# Patient Record
Sex: FEMALE | Race: BLACK OR AFRICAN AMERICAN | NOT HISPANIC OR LATINO | Employment: FULL TIME | URBAN - METROPOLITAN AREA
[De-identification: names, ages, dates, MRNs, and addresses within clinical notes are randomized per-mention and may not be internally consistent; named-entity substitution may affect disease eponyms.]

---

## 2022-04-16 ENCOUNTER — OFFICE VISIT (OUTPATIENT)
Dept: URGENT CARE | Facility: CLINIC | Age: 47
End: 2022-04-16

## 2022-04-16 VITALS
RESPIRATION RATE: 16 BRPM | DIASTOLIC BLOOD PRESSURE: 76 MMHG | TEMPERATURE: 98.4 F | OXYGEN SATURATION: 99 % | BODY MASS INDEX: 33.32 KG/M2 | HEIGHT: 65 IN | HEART RATE: 68 BPM | SYSTOLIC BLOOD PRESSURE: 116 MMHG | WEIGHT: 200 LBS

## 2022-04-16 DIAGNOSIS — K13.0 CHEILITIS: ICD-10-CM

## 2022-04-16 DIAGNOSIS — Z76.89 ENCOUNTER TO ESTABLISH CARE: ICD-10-CM

## 2022-04-16 PROCEDURE — 99202 OFFICE O/P NEW SF 15 MIN: CPT | Performed by: NURSE PRACTITIONER

## 2022-04-16 RX ORDER — MOMETASONE FUROATE 1 MG/G
CREAM TOPICAL
Qty: 15 G | Refills: 0 | Status: SHIPPED | OUTPATIENT
Start: 2022-04-16 | End: 2023-06-16

## 2022-04-16 RX ORDER — CLOTRIMAZOLE 1 %
CREAM (GRAM) TOPICAL
Qty: 12 G | Refills: 0 | Status: SHIPPED | OUTPATIENT
Start: 2022-04-16 | End: 2023-06-16

## 2022-04-16 RX ORDER — LEVOTHYROXINE SODIUM 0.07 MG/1
75 TABLET ORAL
COMMUNITY
End: 2024-03-11

## 2022-04-16 RX ORDER — ALPRAZOLAM 1 MG/1
2 TABLET ORAL NIGHTLY PRN
COMMUNITY
End: 2023-11-10 | Stop reason: SDUPTHER

## 2022-04-16 NOTE — PROGRESS NOTES
"Subjective     Ely Jen Rodas is a 46 y.o. female who presents with Oral Swelling (Dry lips and not sure why. )            Pt reports new onset of dryness to her lips and irritation that started 2 weeks ago. Burning to edges of her lips. She has been treating it with vaseline, carmex and blistex. Denies any improvement in her symptoms. She is a daily smoker. Has not tried any new lotions, soaps or cosmetics to her face.       Review of Systems   HENT:        Redness and irritation to lips   All other systems reviewed and are negative.         Past Medical History:   Diagnosis Date   • Arthritis    • BRCA2 negative       Past Surgical History:   Procedure Laterality Date   • ABDOMINAL HYSTERECTOMY TOTAL     • TUBAL COAGULATION LAPAROSCOPIC BILATERAL        Social History     Socioeconomic History   • Marital status: Single     Spouse name: Not on file   • Number of children: Not on file   • Years of education: Not on file   • Highest education level: Not on file   Occupational History   • Not on file   Tobacco Use   • Smoking status: Never Smoker   • Smokeless tobacco: Never Used   Vaping Use   • Vaping Use: Never used   Substance and Sexual Activity   • Alcohol use: Not Currently   • Drug use: Yes     Types: Marijuana   • Sexual activity: Not on file   Other Topics Concern   • Not on file   Social History Narrative   • Not on file     Social Determinants of Health     Financial Resource Strain: Not on file   Food Insecurity: Not on file   Transportation Needs: Not on file   Physical Activity: Not on file   Stress: Not on file   Social Connections: Not on file   Intimate Partner Violence: Not on file   Housing Stability: Not on file         Objective     /76 (BP Location: Right arm, Patient Position: Sitting, BP Cuff Size: Adult)   Pulse 68   Temp 36.9 °C (98.4 °F) (Temporal)   Resp 16   Ht 1.651 m (5' 5\")   Wt 90.7 kg (200 lb)   SpO2 99%   BMI 33.28 kg/m²      Physical Exam  Vitals and nursing note " reviewed.   Constitutional:       Appearance: Normal appearance. She is normal weight.   HENT:      Head: Normocephalic and atraumatic.      Nose: Nose normal.      Mouth/Throat:      Mouth: Mucous membranes are moist. No oral lesions or angioedema.      Pharynx: Oropharynx is clear.     Eyes:      Extraocular Movements: Extraocular movements intact.      Pupils: Pupils are equal, round, and reactive to light.   Cardiovascular:      Rate and Rhythm: Normal rate and regular rhythm.   Pulmonary:      Effort: Pulmonary effort is normal.   Musculoskeletal:         General: Normal range of motion.      Cervical back: Normal range of motion.   Skin:     General: Skin is warm and dry.      Capillary Refill: Capillary refill takes less than 2 seconds.   Neurological:      General: No focal deficit present.      Mental Status: She is alert and oriented to person, place, and time. Mental status is at baseline.   Psychiatric:         Mood and Affect: Mood normal.         Speech: Speech normal.         Thought Content: Thought content normal.         Judgment: Judgment normal.                             Assessment & Plan        1. Cheilitis  - clotrimazole (LOTRIMIN) 1 % Cream; Apply thin layer to affected areas BID for one week  Dispense: 12 g; Refill: 0  - mometasone (ELOCON) 0.1 % Cream; Apply thin layer to affected areas BID for one week  Dispense: 15 g; Refill: 0    2. Encounter to establish care  - Referral to establish with [facility] PCP       May continue to use vaseline in between applications of steroid cream  Supportive care, differential diagnoses, and indications for immediate follow-up discussed with patient.    Pathogenesis of diagnosis discussed including typical length and natural progression.      Instructed to return to  or nearest emergency department if symptoms fail to improve, for any change in condition, further concerns, or new concerning symptoms.  Patient states understanding of the plan of care and  discharge instructions.

## 2022-04-18 ENCOUNTER — TELEPHONE (OUTPATIENT)
Dept: SCHEDULING | Facility: IMAGING CENTER | Age: 47
End: 2022-04-18

## 2022-07-08 ENCOUNTER — HOSPITAL ENCOUNTER (OUTPATIENT)
Facility: MEDICAL CENTER | Age: 47
End: 2022-07-08
Attending: PREVENTIVE MEDICINE
Payer: COMMERCIAL

## 2022-07-08 ENCOUNTER — EMPLOYEE HEALTH (OUTPATIENT)
Dept: OCCUPATIONAL MEDICINE | Facility: CLINIC | Age: 47
End: 2022-07-08

## 2022-07-08 ENCOUNTER — EH NON-PROVIDER (OUTPATIENT)
Dept: OCCUPATIONAL MEDICINE | Facility: CLINIC | Age: 47
End: 2022-07-08

## 2022-07-08 DIAGNOSIS — Z02.89 VISIT FOR OCCUPATIONAL HEALTH EXAMINATION: ICD-10-CM

## 2022-07-08 DIAGNOSIS — Z02.89 VISIT FOR OCCUPATIONAL HEALTH EXAMINATION: Primary | ICD-10-CM

## 2022-07-08 LAB
AMP AMPHETAMINE: NORMAL
BAR BARBITURATES: NORMAL
BZO BENZODIAZEPINES: NORMAL
COC COCAINE: NORMAL
INT CON NEG: NORMAL
INT CON POS: NORMAL
MDMA ECSTASY: NORMAL
MET METHAMPHETAMINES: NORMAL
MTD METHADONE: NORMAL
OPI OPIATES: NORMAL
OXY OXYCODONE: NORMAL
PCP PHENCYCLIDINE: NORMAL
POC URINE DRUG SCREEN OCDRS: NORMAL
THC: NORMAL

## 2022-07-08 PROCEDURE — 86480 TB TEST CELL IMMUN MEASURE: CPT | Performed by: PREVENTIVE MEDICINE

## 2022-07-08 PROCEDURE — 80305 DRUG TEST PRSMV DIR OPT OBS: CPT | Performed by: PREVENTIVE MEDICINE

## 2022-07-08 PROCEDURE — 90715 TDAP VACCINE 7 YRS/> IM: CPT | Performed by: NURSE PRACTITIONER

## 2022-07-08 PROCEDURE — 8915 PR COMPREHENSIVE PHYSICAL: Performed by: PREVENTIVE MEDICINE

## 2022-07-11 ENCOUNTER — RESEARCH ENCOUNTER (OUTPATIENT)
Dept: RESEARCH | Facility: WORKSITE | Age: 47
End: 2022-07-11
Payer: MEDICAID

## 2022-07-11 DIAGNOSIS — Z00.6 RESEARCH STUDY PATIENT: Primary | ICD-10-CM

## 2022-07-11 LAB
GAMMA INTERFERON BACKGROUND BLD IA-ACNC: 0.09 IU/ML
M TB IFN-G BLD-IMP: NEGATIVE
M TB IFN-G CD4+ BCKGRND COR BLD-ACNC: 0.01 IU/ML
MITOGEN IGNF BCKGRD COR BLD-ACNC: >10 IU/ML
QFT TB2 - NIL TBQ2: 0 IU/ML

## 2022-08-22 LAB
APOB+LDLR+PCSK9 GENE MUT ANL BLD/T: NOT DETECTED
BRCA1+BRCA2 DEL+DUP + FULL MUT ANL BLD/T: NOT DETECTED
MLH1+MSH2+MSH6+PMS2 GN DEL+DUP+FUL M: NOT DETECTED

## 2022-08-26 ENCOUNTER — EH NON-PROVIDER (OUTPATIENT)
Dept: OCCUPATIONAL MEDICINE | Facility: CLINIC | Age: 47
End: 2022-08-26

## 2022-10-07 ENCOUNTER — HOSPITAL ENCOUNTER (OUTPATIENT)
Facility: MEDICAL CENTER | Age: 47
End: 2022-10-07
Attending: SPECIALIST
Payer: COMMERCIAL

## 2022-10-07 PROCEDURE — RXMED WILLOW AMBULATORY MEDICATION CHARGE: Performed by: OBSTETRICS & GYNECOLOGY

## 2022-10-07 PROCEDURE — 87624 HPV HI-RISK TYP POOLED RSLT: CPT

## 2022-10-07 PROCEDURE — 88175 CYTOPATH C/V AUTO FLUID REDO: CPT

## 2022-10-10 ENCOUNTER — PHARMACY VISIT (OUTPATIENT)
Dept: PHARMACY | Facility: MEDICAL CENTER | Age: 47
End: 2022-10-10
Payer: COMMERCIAL

## 2022-10-11 LAB
CYTOLOGY REG CYTOL: NORMAL
HPV HR 12 DNA CVX QL NAA+PROBE: NEGATIVE
HPV16 DNA SPEC QL NAA+PROBE: NEGATIVE
HPV18 DNA SPEC QL NAA+PROBE: NEGATIVE
SPECIMEN SOURCE: NORMAL

## 2022-10-12 PROCEDURE — RXMED WILLOW AMBULATORY MEDICATION CHARGE: Performed by: SPECIALIST

## 2022-10-13 ENCOUNTER — PHARMACY VISIT (OUTPATIENT)
Dept: PHARMACY | Facility: MEDICAL CENTER | Age: 47
End: 2022-10-13
Payer: COMMERCIAL

## 2022-10-25 ENCOUNTER — HOSPITAL ENCOUNTER (OUTPATIENT)
Dept: RADIOLOGY | Facility: MEDICAL CENTER | Age: 47
End: 2022-10-25
Payer: COMMERCIAL

## 2022-11-08 PROCEDURE — RXMED WILLOW AMBULATORY MEDICATION CHARGE: Performed by: OBSTETRICS & GYNECOLOGY

## 2022-11-11 ENCOUNTER — APPOINTMENT (OUTPATIENT)
Dept: RADIOLOGY | Facility: MEDICAL CENTER | Age: 47
End: 2022-11-11
Payer: COMMERCIAL

## 2022-11-11 DIAGNOSIS — Z12.31 VISIT FOR SCREENING MAMMOGRAM: ICD-10-CM

## 2022-11-12 ENCOUNTER — PHARMACY VISIT (OUTPATIENT)
Dept: PHARMACY | Facility: MEDICAL CENTER | Age: 47
End: 2022-11-12
Payer: COMMERCIAL

## 2022-12-29 PROCEDURE — RXMED WILLOW AMBULATORY MEDICATION CHARGE: Performed by: OBSTETRICS & GYNECOLOGY

## 2023-01-03 ENCOUNTER — PHARMACY VISIT (OUTPATIENT)
Dept: PHARMACY | Facility: MEDICAL CENTER | Age: 48
End: 2023-01-03
Payer: COMMERCIAL

## 2023-03-19 PROCEDURE — RXMED WILLOW AMBULATORY MEDICATION CHARGE: Performed by: OBSTETRICS & GYNECOLOGY

## 2023-03-20 ENCOUNTER — PHARMACY VISIT (OUTPATIENT)
Dept: PHARMACY | Facility: MEDICAL CENTER | Age: 48
End: 2023-03-20
Payer: COMMERCIAL

## 2023-05-11 PROCEDURE — RXMED WILLOW AMBULATORY MEDICATION CHARGE: Performed by: OBSTETRICS & GYNECOLOGY

## 2023-05-16 ENCOUNTER — PHARMACY VISIT (OUTPATIENT)
Dept: PHARMACY | Facility: MEDICAL CENTER | Age: 48
End: 2023-05-16
Payer: COMMERCIAL

## 2023-06-14 ENCOUNTER — HOSPITAL ENCOUNTER (OUTPATIENT)
Facility: MEDICAL CENTER | Age: 48
End: 2023-06-14
Attending: OBSTETRICS & GYNECOLOGY
Payer: COMMERCIAL

## 2023-06-14 ENCOUNTER — GYNECOLOGY VISIT (OUTPATIENT)
Dept: OBGYN | Facility: CLINIC | Age: 48
End: 2023-06-14
Payer: COMMERCIAL

## 2023-06-14 VITALS — BODY MASS INDEX: 35.61 KG/M2 | SYSTOLIC BLOOD PRESSURE: 143 MMHG | DIASTOLIC BLOOD PRESSURE: 88 MMHG | WEIGHT: 214 LBS

## 2023-06-14 DIAGNOSIS — Z12.31 BREAST CANCER SCREENING BY MAMMOGRAM: ICD-10-CM

## 2023-06-14 DIAGNOSIS — Z11.3 SCREENING EXAMINATION FOR STD (SEXUALLY TRANSMITTED DISEASE): ICD-10-CM

## 2023-06-14 DIAGNOSIS — Z01.419 WELL WOMAN EXAM WITH ROUTINE GYNECOLOGICAL EXAM: ICD-10-CM

## 2023-06-14 PROCEDURE — 87510 GARDNER VAG DNA DIR PROBE: CPT

## 2023-06-14 PROCEDURE — 87480 CANDIDA DNA DIR PROBE: CPT

## 2023-06-14 PROCEDURE — 3077F SYST BP >= 140 MM HG: CPT | Performed by: OBSTETRICS & GYNECOLOGY

## 2023-06-14 PROCEDURE — 3079F DIAST BP 80-89 MM HG: CPT | Performed by: OBSTETRICS & GYNECOLOGY

## 2023-06-14 PROCEDURE — 87660 TRICHOMONAS VAGIN DIR PROBE: CPT

## 2023-06-14 PROCEDURE — 99386 PREV VISIT NEW AGE 40-64: CPT | Performed by: OBSTETRICS & GYNECOLOGY

## 2023-06-14 NOTE — PROGRESS NOTES
Gynecology Annual    [facility] ALEE MICHAEL    47 y.o.    Chief complaint    Chief Complaint   Patient presents with    Gynecologic Exam     New patient annual        HPI    Patient is a 46 yo  with history of hysterectomy in 2018 in [place] for abnormal uterine bleeding presents today for gyn annual exam. She states she had a pap smear last year at another gynecologist's office and was positive for trichomonas. She states she wasn't sexually active with anyone for several years. She was given antibiotics to treat this.   She has had left sided hip pain for the past 2 months, which is also radiating to her left pelvis. Describes feeling as a 'knot' and cramping.   She reports she did have COVID 19 in February.   Last year was prescribed estradiol patch to treat hot flushes, mood swings and insomnia.   Initially patches were effective, but feels it has worn off.   Reports having chronic loose bowels. She is in search of a local PCP as most of her healthcare she seeks in [place] due to not being able to find healthcare in [place].   In terms of her prior hysterectomy, she is not sure if ovaries were removed or not. She is also unsure if she has a cervix.   Not sexually active. No abnormal or foul odor.       Review of Systems:  Review of Systems   All other systems reviewed and are negative.       Past Obstetrical History:     x 3    Past Gynecological History:    Hx hysterectomy  Last pap:  NILM, HPV negative  H/o abnormal pap: Yes  H/o STIs: hx asymptomatic trichomonas.   DEXA: N/A  Last Mammogram: Unsure  LMP: No LMP recorded. Patient has had a hysterectomy.  BCM: Hysterectomy    Past Medical History    Past Medical History:   Diagnosis Date    Arthritis     BRCA2 negative     Hypothyroid        Past Surgical History    Past Surgical History:   Procedure Laterality Date    ABDOMINAL HYSTERECTOMY TOTAL      LEEP      TUBAL COAGULATION LAPAROSCOPIC BILATERAL         History reviewed. No pertinent family  history.    Allergies    Allergies   Allergen Reactions    Amoxicillin     Latex     Penicillins        Medications    Current Outpatient Medications   Medication Sig Dispense Refill    tinidazole (TINDAMAX) 500 MG tablet Take 1 oral tablet 2 times a day 14 Tablet 0    Estradiol (LILA) 0.025 MG/24HR PATCH BIWEEKLY Apply one patch twice weekly for menopausal symptoms 8 Patch 5    levothyroxine (SYNTHROID) 75 MCG Tab Take 75 mcg by mouth every morning on an empty stomach.      ALPRAZolam (XANAX) 1 MG Tab Take 1 mg by mouth at bedtime as needed for Sleep.      clotrimazole (LOTRIMIN) 1 % Cream Apply thin layer to affected areas BID for one week 12 g 0    mometasone (ELOCON) 0.1 % Cream Apply thin layer to affected areas BID for one week 15 g 0     No current facility-administered medications for this visit.       Social    Social History     Tobacco Use    Smoking status: Never    Smokeless tobacco: Never   Vaping Use    Vaping Use: Never used   Substance Use Topics    Alcohol use: Not Currently    Drug use: Yes     Types: Marijuana        OBJECTIVE:    Vitals    BP (!) 143/88 (BP Location: Right arm, Patient Position: Sitting, BP Cuff Size: Large adult)   Wt 214 lb   BMI 35.61 kg/m²     Physical Exam    GENERAL: Well developed, well nourished, female in no acute distress.    HEENT: NCAT, mucus membranes moist    Neck: Supple, nontender, no DAVID, no thyromegaly    Breasts: Symmetric, Nontender, no masses, no nipple discharge, no skin changes    CV: RRR    Pulm: CTAB    Abdomen: Soft ND NT. Prior laparoscopy scars well healed.     Ext: CARDENAS    : Normal Vulva, vagina. No lesions present. No abnormal discharge. No blood.    Urethral meatus normal.    Vaginal apex clear, no abnormal lesions, discharge or blood. Cervix and uterus surgically absent. Vaginitis swab collected.     Adnexa no masses or tenderness.     Labs/Pathology:    10/7/2022   Pap NILM, HPV negative  Trichomonas +    A/P    There is no problem list on  file for this patient.      DAVID MICHAEL    47 y.o.    No obstetric history on file.    1. Well woman exam with routine gynecological exam - pap no longer indicated due to history of hysterectomy. Continue yearly pelvic and breast exam. Monthly self breast exam encouraged.    2. Breast cancer screening by mammogram - screening mammogram ordered. Advised to obtain this yearly.    3. Screening examination for STD (sexually transmitted disease) - history of trichomonas, vaginitis swab collected. Urine GC/CT ordered.      RTC in 1 year or PRN.       Time spent: 30 minutes      Pedro Nuñez M.D.    Obstetrics and Gynecology    6/14/20232:01 PM

## 2023-06-15 LAB
CANDIDA DNA VAG QL PROBE+SIG AMP: NEGATIVE
G VAGINALIS DNA VAG QL PROBE+SIG AMP: NEGATIVE
T VAGINALIS DNA VAG QL PROBE+SIG AMP: NEGATIVE

## 2023-06-16 ENCOUNTER — OFFICE VISIT (OUTPATIENT)
Dept: MEDICAL GROUP | Facility: MEDICAL CENTER | Age: 48
End: 2023-06-16
Payer: COMMERCIAL

## 2023-06-16 ENCOUNTER — HOSPITAL ENCOUNTER (OUTPATIENT)
Dept: LAB | Facility: MEDICAL CENTER | Age: 48
End: 2023-06-16
Attending: STUDENT IN AN ORGANIZED HEALTH CARE EDUCATION/TRAINING PROGRAM
Payer: COMMERCIAL

## 2023-06-16 ENCOUNTER — APPOINTMENT (OUTPATIENT)
Dept: RADIOLOGY | Facility: MEDICAL CENTER | Age: 48
End: 2023-06-16
Attending: STUDENT IN AN ORGANIZED HEALTH CARE EDUCATION/TRAINING PROGRAM
Payer: COMMERCIAL

## 2023-06-16 VITALS
BODY MASS INDEX: 37.85 KG/M2 | TEMPERATURE: 97.1 F | OXYGEN SATURATION: 100 % | DIASTOLIC BLOOD PRESSURE: 64 MMHG | HEART RATE: 69 BPM | SYSTOLIC BLOOD PRESSURE: 118 MMHG | HEIGHT: 63 IN | WEIGHT: 213.63 LBS

## 2023-06-16 DIAGNOSIS — E03.9 ACQUIRED HYPOTHYROIDISM: ICD-10-CM

## 2023-06-16 DIAGNOSIS — D64.9 ANEMIA, UNSPECIFIED TYPE: ICD-10-CM

## 2023-06-16 DIAGNOSIS — M25.552 LEFT HIP PAIN: ICD-10-CM

## 2023-06-16 DIAGNOSIS — Z13.1 SCREENING FOR DIABETES MELLITUS: ICD-10-CM

## 2023-06-16 DIAGNOSIS — G89.29 CHRONIC PAIN OF BOTH KNEES: ICD-10-CM

## 2023-06-16 DIAGNOSIS — M25.561 CHRONIC PAIN OF BOTH KNEES: ICD-10-CM

## 2023-06-16 DIAGNOSIS — K21.9 GASTROESOPHAGEAL REFLUX DISEASE WITHOUT ESOPHAGITIS: ICD-10-CM

## 2023-06-16 DIAGNOSIS — F41.9 ANXIETY: ICD-10-CM

## 2023-06-16 DIAGNOSIS — Z11.3 SCREENING EXAMINATION FOR STD (SEXUALLY TRANSMITTED DISEASE): ICD-10-CM

## 2023-06-16 DIAGNOSIS — Z12.11 COLON CANCER SCREENING: ICD-10-CM

## 2023-06-16 DIAGNOSIS — Z13.6 SCREENING FOR CARDIOVASCULAR CONDITION: ICD-10-CM

## 2023-06-16 DIAGNOSIS — Z11.59 NEED FOR HEPATITIS C SCREENING TEST: ICD-10-CM

## 2023-06-16 DIAGNOSIS — G25.81 RESTLESS LEG: ICD-10-CM

## 2023-06-16 DIAGNOSIS — Z00.00 ENCOUNTER FOR MEDICAL EXAMINATION TO ESTABLISH CARE: ICD-10-CM

## 2023-06-16 DIAGNOSIS — M25.562 CHRONIC PAIN OF BOTH KNEES: ICD-10-CM

## 2023-06-16 LAB
ALBUMIN SERPL BCP-MCNC: 4.1 G/DL (ref 3.2–4.9)
ALBUMIN/GLOB SERPL: 1.2 G/DL
ALP SERPL-CCNC: 122 U/L (ref 30–99)
ALT SERPL-CCNC: 34 U/L (ref 2–50)
ANION GAP SERPL CALC-SCNC: 10 MMOL/L (ref 7–16)
AST SERPL-CCNC: 17 U/L (ref 12–45)
BASOPHILS # BLD AUTO: 0.5 % (ref 0–1.8)
BASOPHILS # BLD: 0.03 K/UL (ref 0–0.12)
BILIRUB SERPL-MCNC: 0.3 MG/DL (ref 0.1–1.5)
BUN SERPL-MCNC: 14 MG/DL (ref 8–22)
CALCIUM ALBUM COR SERPL-MCNC: 9.2 MG/DL (ref 8.5–10.5)
CALCIUM SERPL-MCNC: 9.3 MG/DL (ref 8.4–10.2)
CHLORIDE SERPL-SCNC: 107 MMOL/L (ref 96–112)
CHOLEST SERPL-MCNC: 193 MG/DL (ref 100–199)
CO2 SERPL-SCNC: 23 MMOL/L (ref 20–33)
CREAT SERPL-MCNC: 0.7 MG/DL (ref 0.5–1.4)
EOSINOPHIL # BLD AUTO: 0.34 K/UL (ref 0–0.51)
EOSINOPHIL NFR BLD: 5.8 % (ref 0–6.9)
ERYTHROCYTE [DISTWIDTH] IN BLOOD BY AUTOMATED COUNT: 40.2 FL (ref 35.9–50)
EST. AVERAGE GLUCOSE BLD GHB EST-MCNC: 123 MG/DL
FASTING STATUS PATIENT QL REPORTED: NORMAL
GFR SERPLBLD CREATININE-BSD FMLA CKD-EPI: 107 ML/MIN/1.73 M 2
GLOBULIN SER CALC-MCNC: 3.3 G/DL (ref 1.9–3.5)
GLUCOSE SERPL-MCNC: 92 MG/DL (ref 65–99)
HBA1C MFR BLD: 5.9 % (ref 4–5.6)
HCT VFR BLD AUTO: 42.8 % (ref 37–47)
HCV AB SER QL: NORMAL
HDLC SERPL-MCNC: 64 MG/DL
HGB BLD-MCNC: 13.8 G/DL (ref 12–16)
IMM GRANULOCYTES # BLD AUTO: 0.01 K/UL (ref 0–0.11)
IMM GRANULOCYTES NFR BLD AUTO: 0.2 % (ref 0–0.9)
LDLC SERPL CALC-MCNC: 112 MG/DL
LYMPHOCYTES # BLD AUTO: 2.73 K/UL (ref 1–4.8)
LYMPHOCYTES NFR BLD: 46.3 % (ref 22–41)
MCH RBC QN AUTO: 26.5 PG (ref 27–33)
MCHC RBC AUTO-ENTMCNC: 32.2 G/DL (ref 32.2–35.5)
MCV RBC AUTO: 82.3 FL (ref 81.4–97.8)
MONOCYTES # BLD AUTO: 0.31 K/UL (ref 0–0.85)
MONOCYTES NFR BLD AUTO: 5.3 % (ref 0–13.4)
NEUTROPHILS # BLD AUTO: 2.47 K/UL (ref 1.82–7.42)
NEUTROPHILS NFR BLD: 41.9 % (ref 44–72)
NRBC # BLD AUTO: 0 K/UL
NRBC BLD-RTO: 0 /100 WBC (ref 0–0.2)
PLATELET # BLD AUTO: 263 K/UL (ref 164–446)
PMV BLD AUTO: 10.3 FL (ref 9–12.9)
POTASSIUM SERPL-SCNC: 4 MMOL/L (ref 3.6–5.5)
PROT SERPL-MCNC: 7.4 G/DL (ref 6–8.2)
RBC # BLD AUTO: 5.2 M/UL (ref 4.2–5.4)
SODIUM SERPL-SCNC: 140 MMOL/L (ref 135–145)
T4 FREE SERPL-MCNC: 1.44 NG/DL (ref 0.93–1.7)
TRIGL SERPL-MCNC: 84 MG/DL (ref 0–149)
TSH SERPL DL<=0.005 MIU/L-ACNC: 0.44 UIU/ML (ref 0.38–5.33)
WBC # BLD AUTO: 5.9 K/UL (ref 4.8–10.8)

## 2023-06-16 PROCEDURE — 80061 LIPID PANEL: CPT

## 2023-06-16 PROCEDURE — 99214 OFFICE O/P EST MOD 30 MIN: CPT | Performed by: STUDENT IN AN ORGANIZED HEALTH CARE EDUCATION/TRAINING PROGRAM

## 2023-06-16 PROCEDURE — 87591 N.GONORRHOEAE DNA AMP PROB: CPT

## 2023-06-16 PROCEDURE — 3074F SYST BP LT 130 MM HG: CPT | Performed by: STUDENT IN AN ORGANIZED HEALTH CARE EDUCATION/TRAINING PROGRAM

## 2023-06-16 PROCEDURE — 80053 COMPREHEN METABOLIC PANEL: CPT

## 2023-06-16 PROCEDURE — 85025 COMPLETE CBC W/AUTO DIFF WBC: CPT

## 2023-06-16 PROCEDURE — 36415 COLL VENOUS BLD VENIPUNCTURE: CPT

## 2023-06-16 PROCEDURE — 3078F DIAST BP <80 MM HG: CPT | Performed by: STUDENT IN AN ORGANIZED HEALTH CARE EDUCATION/TRAINING PROGRAM

## 2023-06-16 PROCEDURE — 87491 CHLMYD TRACH DNA AMP PROBE: CPT

## 2023-06-16 PROCEDURE — 84443 ASSAY THYROID STIM HORMONE: CPT

## 2023-06-16 PROCEDURE — 84439 ASSAY OF FREE THYROXINE: CPT

## 2023-06-16 PROCEDURE — 86803 HEPATITIS C AB TEST: CPT

## 2023-06-16 PROCEDURE — RXMED WILLOW AMBULATORY MEDICATION CHARGE: Performed by: STUDENT IN AN ORGANIZED HEALTH CARE EDUCATION/TRAINING PROGRAM

## 2023-06-16 PROCEDURE — 83036 HEMOGLOBIN GLYCOSYLATED A1C: CPT

## 2023-06-16 PROCEDURE — 73502 X-RAY EXAM HIP UNI 2-3 VIEWS: CPT | Mod: LT

## 2023-06-16 RX ORDER — CYCLOBENZAPRINE HCL 5 MG
5 TABLET ORAL 3 TIMES DAILY PRN
Qty: 30 TABLET | Refills: 0 | Status: SHIPPED | OUTPATIENT
Start: 2023-06-16

## 2023-06-16 RX ORDER — LANSOPRAZOLE 30 MG/1
CAPSULE, DELAYED RELEASE ORAL
COMMUNITY
End: 2024-02-29 | Stop reason: SDUPTHER

## 2023-06-16 SDOH — HEALTH STABILITY: PHYSICAL HEALTH: ON AVERAGE, HOW MANY MINUTES DO YOU ENGAGE IN EXERCISE AT THIS LEVEL?: PATIENT DECLINED

## 2023-06-16 SDOH — ECONOMIC STABILITY: INCOME INSECURITY: IN THE LAST 12 MONTHS, WAS THERE A TIME WHEN YOU WERE NOT ABLE TO PAY THE MORTGAGE OR RENT ON TIME?: PATIENT REFUSED

## 2023-06-16 SDOH — ECONOMIC STABILITY: FOOD INSECURITY: WITHIN THE PAST 12 MONTHS, THE FOOD YOU BOUGHT JUST DIDN'T LAST AND YOU DIDN'T HAVE MONEY TO GET MORE.: SOMETIMES TRUE

## 2023-06-16 SDOH — ECONOMIC STABILITY: FOOD INSECURITY: WITHIN THE PAST 12 MONTHS, YOU WORRIED THAT YOUR FOOD WOULD RUN OUT BEFORE YOU GOT MONEY TO BUY MORE.: NEVER TRUE

## 2023-06-16 SDOH — ECONOMIC STABILITY: INCOME INSECURITY: HOW HARD IS IT FOR YOU TO PAY FOR THE VERY BASICS LIKE FOOD, HOUSING, MEDICAL CARE, AND HEATING?: SOMEWHAT HARD

## 2023-06-16 SDOH — HEALTH STABILITY: PHYSICAL HEALTH: ON AVERAGE, HOW MANY DAYS PER WEEK DO YOU ENGAGE IN MODERATE TO STRENUOUS EXERCISE (LIKE A BRISK WALK)?: 1 DAY

## 2023-06-16 SDOH — ECONOMIC STABILITY: HOUSING INSECURITY
IN THE LAST 12 MONTHS, WAS THERE A TIME WHEN YOU DID NOT HAVE A STEADY PLACE TO SLEEP OR SLEPT IN A SHELTER (INCLUDING NOW)?: PATIENT REFUSED

## 2023-06-16 SDOH — ECONOMIC STABILITY: HOUSING INSECURITY

## 2023-06-16 SDOH — ECONOMIC STABILITY: TRANSPORTATION INSECURITY
IN THE PAST 12 MONTHS, HAS THE LACK OF TRANSPORTATION KEPT YOU FROM MEDICAL APPOINTMENTS OR FROM GETTING MEDICATIONS?: PATIENT DECLINED

## 2023-06-16 SDOH — ECONOMIC STABILITY: TRANSPORTATION INSECURITY
IN THE PAST 12 MONTHS, HAS LACK OF RELIABLE TRANSPORTATION KEPT YOU FROM MEDICAL APPOINTMENTS, MEETINGS, WORK OR FROM GETTING THINGS NEEDED FOR DAILY LIVING?: PATIENT DECLINED

## 2023-06-16 SDOH — HEALTH STABILITY: MENTAL HEALTH
STRESS IS WHEN SOMEONE FEELS TENSE, NERVOUS, ANXIOUS, OR CAN'T SLEEP AT NIGHT BECAUSE THEIR MIND IS TROUBLED. HOW STRESSED ARE YOU?: VERY MUCH

## 2023-06-16 SDOH — ECONOMIC STABILITY: TRANSPORTATION INSECURITY
IN THE PAST 12 MONTHS, HAS LACK OF TRANSPORTATION KEPT YOU FROM MEETINGS, WORK, OR FROM GETTING THINGS NEEDED FOR DAILY LIVING?: PATIENT DECLINED

## 2023-06-16 ASSESSMENT — LIFESTYLE VARIABLES
SKIP TO QUESTIONS 9-10: 1
HOW MANY STANDARD DRINKS CONTAINING ALCOHOL DO YOU HAVE ON A TYPICAL DAY: PATIENT DOES NOT DRINK
AUDIT-C TOTAL SCORE: 0
HOW OFTEN DO YOU HAVE SIX OR MORE DRINKS ON ONE OCCASION: NEVER
HOW OFTEN DO YOU HAVE A DRINK CONTAINING ALCOHOL: NEVER

## 2023-06-16 ASSESSMENT — SOCIAL DETERMINANTS OF HEALTH (SDOH)
ARE YOU MARRIED, WIDOWED, DIVORCED, SEPARATED, NEVER MARRIED, OR LIVING WITH A PARTNER?: NEVER MARRIED
HOW OFTEN DO YOU GET TOGETHER WITH FRIENDS OR RELATIVES?: NEVER
HOW OFTEN DO YOU ATTEND CHURCH OR RELIGIOUS SERVICES?: PATIENT DECLINED
HOW OFTEN DO YOU HAVE A DRINK CONTAINING ALCOHOL: NEVER
HOW OFTEN DO YOU ATTEND CHURCH OR RELIGIOUS SERVICES?: PATIENT DECLINED
DO YOU BELONG TO ANY CLUBS OR ORGANIZATIONS SUCH AS CHURCH GROUPS UNIONS, FRATERNAL OR ATHLETIC GROUPS, OR SCHOOL GROUPS?: NO
WITHIN THE PAST 12 MONTHS, YOU WORRIED THAT YOUR FOOD WOULD RUN OUT BEFORE YOU GOT THE MONEY TO BUY MORE: NEVER TRUE
HOW OFTEN DO YOU ATTENT MEETINGS OF THE CLUB OR ORGANIZATION YOU BELONG TO?: NEVER
IN A TYPICAL WEEK, HOW MANY TIMES DO YOU TALK ON THE PHONE WITH FAMILY, FRIENDS, OR NEIGHBORS?: ONCE A WEEK
IN A TYPICAL WEEK, HOW MANY TIMES DO YOU TALK ON THE PHONE WITH FAMILY, FRIENDS, OR NEIGHBORS?: ONCE A WEEK
HOW OFTEN DO YOU HAVE SIX OR MORE DRINKS ON ONE OCCASION: NEVER
HOW OFTEN DO YOU GET TOGETHER WITH FRIENDS OR RELATIVES?: NEVER
ARE YOU MARRIED, WIDOWED, DIVORCED, SEPARATED, NEVER MARRIED, OR LIVING WITH A PARTNER?: NEVER MARRIED
HOW HARD IS IT FOR YOU TO PAY FOR THE VERY BASICS LIKE FOOD, HOUSING, MEDICAL CARE, AND HEATING?: SOMEWHAT HARD
HOW OFTEN DO YOU ATTENT MEETINGS OF THE CLUB OR ORGANIZATION YOU BELONG TO?: NEVER
DO YOU BELONG TO ANY CLUBS OR ORGANIZATIONS SUCH AS CHURCH GROUPS UNIONS, FRATERNAL OR ATHLETIC GROUPS, OR SCHOOL GROUPS?: NO
HOW MANY DRINKS CONTAINING ALCOHOL DO YOU HAVE ON A TYPICAL DAY WHEN YOU ARE DRINKING: PATIENT DOES NOT DRINK

## 2023-06-16 ASSESSMENT — PATIENT HEALTH QUESTIONNAIRE - PHQ9
5. POOR APPETITE OR OVEREATING: 3 - NEARLY EVERY DAY
CLINICAL INTERPRETATION OF PHQ2 SCORE: 5
SUM OF ALL RESPONSES TO PHQ QUESTIONS 1-9: 18

## 2023-06-16 NOTE — PROGRESS NOTES
"Subjective:     CC:  Diagnoses of Encounter for medical examination to establish care, Gastroesophageal reflux disease without esophagitis, Anxiety, Acquired hypothyroidism, Anemia, unspecified type, Restless leg, Chronic pain of both knees, Left hip pain, Colon cancer screening, Screening for diabetes mellitus, Need for hepatitis C screening test, and Screening for cardiovascular condition were pertinent to this visit.    HISTORY OF THE PRESENT ILLNESS: Patient is a 47 y.o. female. This pleasant patient is here today to establish care and discuss the following    Problem   Gastroesophageal Reflux Disease Without Esophagitis    This is a chronic condition.  Currently well controlled with Prevacid.     Anxiety    This is a chronic condition.  Currently well controlled with Xanax 2 mg nightly as needed.  She is not due for refills at this time.     Acquired Hypothyroidism    This is a chronic condition, currently feeling like she is not well controlled.  Requesting a referral to endocrinology.  Currently on 75 mcg.  No recent labs.     Restless Leg    This is a chronic condition.  Patient states she is on a medication with good relief, she cannot remember the name of the medication     Left Hip Pain    This is a new problem that started several days ago.  She stated that she was laying down and went to get up and had acute pain in the left hip.  Since then the hip has been tight and she has been having difficulty moving it.  The pain is in the hip joint and going down the groin     Chronic Pain of Both Knees    This is a chronic condition.  Status post scopes bilaterally.  She has not done physical therapy in several years and is open to restarting.     Anemia    This is a chronic condition per patient, no recent labs       ROS:   ROS      Objective:     Exam: /64   Pulse 69   Temp 36.2 °C (97.1 °F) (Temporal)   Ht 1.6 m (5' 3\")   Wt 96.9 kg (213 lb 10 oz)   SpO2 100%  Body mass index is 37.84 " kg/m².    Physical Exam  Vitals reviewed.   Constitutional:       General: She is not in acute distress.     Appearance: She is not toxic-appearing.   HENT:      Head: Normocephalic and atraumatic.      Right Ear: External ear normal.      Left Ear: External ear normal.   Eyes:      General:         Right eye: No discharge.         Left eye: No discharge.      Extraocular Movements: Extraocular movements intact.      Conjunctiva/sclera: Conjunctivae normal.   Cardiovascular:      Rate and Rhythm: Normal rate and regular rhythm.      Pulses: Normal pulses.      Heart sounds: Normal heart sounds. No murmur heard.  Pulmonary:      Effort: Pulmonary effort is normal. No respiratory distress.      Breath sounds: Normal breath sounds. No wheezing or rales.   Skin:     General: Skin is warm and dry.   Neurological:      Mental Status: She is alert.   Psychiatric:         Mood and Affect: Mood normal.         Behavior: Behavior normal.         Thought Content: Thought content normal.         Judgment: Judgment normal.           Assessment & Plan:   47 y.o. female with the following -    1. Encounter for medical examination to establish care  History, problem list, medications and allergies reviewed.  Records requested from previous provider if applicable.    2. Gastroesophageal reflux disease without esophagitis  Chronic, well controlled, continue Prevacid    3. Anxiety  Chronic, well controlled on Xanax, not due for refill, discussed the [place] [facility] and [facility] requirements for controlled substances.  She will return to clinic when she is due for refill to complete these    4. Acquired hypothyroidism  Chronic, no recent labs, continue Synthroid at current dosing, referral to endocrinology sent, labs as below  - TSH; Future  - FREE THYROXINE; Future  - Referral to Endocrinology    5. Anemia, unspecified type  Chronic, no recent labs, labs as below  - CBC WITH DIFFERENTIAL; Future    6. Restless leg  Chronic, cannot relate a  medication she is on, she will work on getting the name of this medication    7. Chronic pain of both knees  Chronic, worsening, referral to physical therapy sent  - Referral to Physical Therapy    8. Left hip pain  This is an acute condition, x-ray ordered, Flexeril ordered.  Discussed the risks of Flexeril and that she cannot mix this with alcohol or take Xanax the same day she takes her Flexeril  - DX-HIP-COMPLETE - UNILATERAL 2+ LEFT; Future  - cyclobenzaprine (FLEXERIL) 5 mg tablet; Take 1 Tablet by mouth 3 times a day as needed for Muscle Spasms or Moderate Pain.  Dispense: 30 Tablet; Refill: 0    9. Colon cancer screening  - Referral to GI for Colonoscopy    10. Screening for diabetes mellitus  - Comp Metabolic Panel; Future  - HEMOGLOBIN A1C; Future    11. Need for hepatitis C screening test  - HEP C VIRUS ANTIBODY; Future    12. Screening for cardiovascular condition  - Lipid Profile; Future    No follow-ups on file.    Please note that this dictation was created using voice recognition software. I have made every reasonable attempt to correct obvious errors, but I expect that there are errors of grammar and possibly content that I did not discover before finalizing the note.

## 2023-06-17 LAB
C TRACH DNA SPEC QL NAA+PROBE: NEGATIVE
N GONORRHOEA DNA SPEC QL NAA+PROBE: NEGATIVE
SPECIMEN SOURCE: NORMAL

## 2023-06-18 ENCOUNTER — PHARMACY VISIT (OUTPATIENT)
Dept: PHARMACY | Facility: MEDICAL CENTER | Age: 48
End: 2023-06-18
Payer: COMMERCIAL

## 2023-06-19 ENCOUNTER — APPOINTMENT (OUTPATIENT)
Dept: RADIOLOGY | Facility: MEDICAL CENTER | Age: 48
End: 2023-06-19
Attending: OBSTETRICS & GYNECOLOGY
Payer: COMMERCIAL

## 2023-06-23 ENCOUNTER — APPOINTMENT (OUTPATIENT)
Dept: RADIOLOGY | Facility: MEDICAL CENTER | Age: 48
End: 2023-06-23
Attending: OBSTETRICS & GYNECOLOGY
Payer: COMMERCIAL

## 2023-06-23 DIAGNOSIS — Z12.31 BREAST CANCER SCREENING BY MAMMOGRAM: ICD-10-CM

## 2023-06-23 PROCEDURE — 77063 BREAST TOMOSYNTHESIS BI: CPT

## 2023-06-27 ENCOUNTER — HOSPITAL ENCOUNTER (OUTPATIENT)
Dept: RADIOLOGY | Facility: MEDICAL CENTER | Age: 48
End: 2023-06-27
Payer: COMMERCIAL

## 2023-07-21 ENCOUNTER — PHYSICAL THERAPY (OUTPATIENT)
Dept: PHYSICAL THERAPY | Facility: REHABILITATION | Age: 48
End: 2023-07-21
Attending: STUDENT IN AN ORGANIZED HEALTH CARE EDUCATION/TRAINING PROGRAM
Payer: COMMERCIAL

## 2023-07-21 DIAGNOSIS — M25.562 CHRONIC PAIN OF BOTH KNEES: ICD-10-CM

## 2023-07-21 DIAGNOSIS — G89.29 CHRONIC PAIN OF BOTH KNEES: ICD-10-CM

## 2023-07-21 DIAGNOSIS — M25.561 CHRONIC PAIN OF BOTH KNEES: ICD-10-CM

## 2023-07-21 PROCEDURE — 97110 THERAPEUTIC EXERCISES: CPT

## 2023-07-21 PROCEDURE — 97161 PT EVAL LOW COMPLEX 20 MIN: CPT

## 2023-07-21 ASSESSMENT — ENCOUNTER SYMPTOMS
QUALITY: HOT
QUALITY: ACHING
ALLEVIATING FACTORS: LYING DOWN
PAIN TIMING: CONSTANT
QUALITY: THROBBING
PAIN SCALE: 8
PAIN SCALE AT HIGHEST: 10
EXACERBATED BY: PROLONGED STANDING
ALLEVIATING FACTORS: REST
EXACERBATED BY: PROLONGED SITTING
ALLEVIATING FACTORS: SITTING DOWN
EXACERBATED BY: STAIR CLIMBING
QUALITY: PULSATING
PAIN LOCATION: B KNEE PAIN
PAIN SCALE AT LOWEST: 3
AWAKENINGS PER NIGHT: 6
EXACERBATED BY: WALKING

## 2023-07-21 NOTE — OP THERAPY EVALUATION
"  Outpatient Physical Therapy  INITIAL EVALUATION    [facility] Physical Therapy [place] [address]  [address]  [place] [address]  Phone:  [phone]  Fax:  [phone]    Date of Evaluation: 07/21/2023    Patient: Ely MICHAEL  YOB: 1975  MRN: [id number]     Referring Provider: Myesha Stone M.D.  [address]  [address]  [place] [address]   Referring Diagnosis Chronic pain of both knees [M25.561, M25.562, G89.29]     Time Calculation  Start time: 0845  Stop time: 0928 Time Calculation (min): 43 minutes         Chief Complaint: Knee Problem and Hip Problem    Visit Diagnoses     ICD-10-CM   1. Chronic pain of both knees  M25.561    M25.562    G89.29       Date of onset of impairment: 6/16/2023    Subjective:   History of Present Illness:     Date of onset:  6/16/2023    Mechanism of injury:  Patient is a pleasant 47 year old female who presents to PT session with complaint of B, chronic knee pain. Patient reports she had a \"scope\" done to B knees in 2000 in [facility], unable to fully describe what she had done but believes she was \"missing cartilage.\" Patient reports that recently her knee pain is worsening and she feels like she is missing strength in her knees and states it feels like they are \"bone on bone.\" She currently does not have an orthopedic consult.     She states that she also woke up one day with L hip pain in February, insidious onset, which she states has not really improved at all. She reports difficulty specifically with ER. Patient reports she was prescribed muscle relaxers but she stopped taking them due to feeling like they weren't working and making her more drowsy.     Patient reports B foot numbness and tingling. Patient reports her knees and B calves ache. She reports occasional swelling. She reports she does not use ice or heat.     Denies bowel and bladder changes. Denies other red flags.     Patient reports she takes Ibuprofen for knee pain as " "needed. Patient reports B knee popping which is painful occasionally. She reports occasional locking and catching. She reports stairs are difficult.   Prior level of function:  Independent  Headaches:  no headaches  Ear problems: none  Sleep disturbance:  Interrupted sleep and non-restful sleep  # Times/Night awakened:  6  Pain:     Current pain ratin    At best pain rating:  3    At worst pain rating:  10    Location:  B knee pain    Quality:  Aching, hot, throbbing and pulsating    Pain timing:  Constant    Relieving factors:  Rest, lying down and sitting down    Aggravating factors:  Prolonged standing, prolonged sitting, stair climbing and walking    Progression:  Worsening  Social Support:     Lives in:  Apartment (1 level apartment)    Lives with:  Adult children  Hand dominance:  Right  Diagnostic Tests:     Diagnostic Tests Comments:  23, L hip xray:   \"IMPRESSION:  1. No acute osseous abnormality\"    No imaging for B knees currently noted.   Treatments:     Previous treatment:  Physical therapy    Current treatment:  Physical therapy  Activities of Daily Living:     Patient reported ADL status: Independent dressing and bathing; some difficulty getting out of the bath tub   Patient Goals:     Patient goals for therapy:  Decreased pain, increased strength and increased motion    Other patient goals:  Return to fitness activities      Past Medical History:   Diagnosis Date    Arthritis     BRCA2 negative     Hypothyroid      Past Surgical History:   Procedure Laterality Date    ABDOMINAL HYSTERECTOMY TOTAL      KNEE ARTHROSCOPY Bilateral     LEEP      TUBAL COAGULATION LAPAROSCOPIC BILATERAL       Social History     Tobacco Use    Smoking status: Never    Smokeless tobacco: Never   Substance Use Topics    Alcohol use: Not Currently     Family and Occupational History     Socioeconomic History    Marital status: Single     Spouse name: Not on file    Number of children: Not on file    Years of " "education: Not on file    Highest education level: GED or equivalent   Occupational History    Not on file       Objective     Observations     Additional Observation Details  No swelling noted on evaluation; reports swelling after work frequently  Denies muscle spasms       Neurological Testing     Sensation     Knee   Left Knee   Diminished: Light touch     Right Knee   Intact: light touch     Reflexes   Left   Clonus sign: negative    Right   Clonus sign: negative    Additional Neurological Details  Diminished sensation to L LE: L3 medial/posterior aspect of foot and L5, lateral inferior knee/ upper calf     Palpation   Left   No palpable tenderness to the rectus femoris.   Tenderness of the distal biceps femoris, distal semimembranosus, distal semitendinosus, lateral gastrocnemius, medial gastrocnemius, rectus femoris, vastus lateralis and vastus medialis.     Right   Tenderness of the distal biceps femoris, distal semimembranosus, distal semitendinosus, lateral gastrocnemius, medial gastrocnemius, rectus femoris, vastus lateralis and vastus medialis.     Additional Palpation Details  Generalized knee tenderness reported     Tenderness   Left Knee   Tenderness in the ITB, lateral joint line, LCL (distal), MCL (distal), MCL (proximal), medial joint line and pes anserinus. No tenderness in the inferior patella, lateral patella, LCL (proximal), medial patella, patellar tendon, quadriceps tendon and superior patella.     Right Knee   Tenderness in the ITB, lateral joint line, LCL (proximal), MCL (distal), medial joint line, patellar tendon, pes anserinus and quadriceps tendon. No tenderness in the inferior patella, lateral patella, LCL (distal), MCL (proximal), medial patella and superior patella.     Additional Tenderness Details  Generalized tenderness reported; reports pain as \"deep\" inside knee     Active Range of Motion   Left Knee   Flexion: 115 degrees   Extension: 0 degrees     Right Knee   Flexion: 115 " "degrees   Extension: 4 degrees     Additional Active Range of Motion Details  Reports regularly completing heel slides     Patellar Static Positioning     Additional Patellar Static Positioning Details  Slight lateral positioning of B patella noted    Patellar Mobility   Left Knee Patellar tendons within functional limits include the medial, lateral, superior and inferior.     Right Knee Patellar tendons within functional limits include the medial, lateral, superior and inferior.     Strength:      Left Knee   Flexion: 4  Extension: 5  Quadriceps contraction: fair    Right Knee   Flexion: 4-  Extension: 4  Quadriceps contraction: fair    Additional Strength Details  Reports feeling R knee \"gives out\" on occasion    Tests     Left Knee   Negative anterior drawer, lateral Herrera, medial Herrera, patellar apprehension, patella-femoral grind, posterior drawer, valgus stress test at 0 degrees, valgus stress test at 30 degrees, varus stress test at 0 degrees and varus stress test at 30 degrees.     Right Knee   Negative anterior drawer, lateral Herrera, medial Herrera, patellar apprehension, patella-femoral grind, posterior drawer, valgus stress test at 0 degrees, valgus stress test at 30 degrees, varus stress test at 0 degrees and varus stress test at 30 degrees.         Therapeutic Exercises (CPT 04182):     1. quad sets, x10, 3-5 second holds; small towel roll to promote improved comfort on R LE, added to HEP    2. Heel slides, demos throughout evaluation; educated for HEP    20. PN due 8/21; Recert due 9/15      Time-based treatments/modalities:    Physical Therapy Timed Treatment Charges  Therapeutic exercise minutes (CPT 12517): 10 minutes      Assessment, Response and Plan:   Impairments: abnormal or restricted ROM, activity intolerance, impaired functional mobility, impaired physical strength, lacks appropriate home exercise program, limited mobility and pain with function    Assessment details:  Patient is a " pleasant 47 year old female who presents to PT evaluation with complaint of B knee pain. Patient reports history of B scope, years ago, in [place], which she is unable to recall results of. Patient demonstrates B knee strength deficits and R knee extension AROM deficit. She also reports difficulty completing any fitness related activities due to pain, reports pain with prolonged sitting or standing, stairs, and with distance walking. At this time, patient will benefit from skilled PT follow up to promote improved pain, strength, and ROM for increased functional mobility skills and improved quality of life.   Barriers to therapy:  None  Prognosis: fair    Prognosis details:  Fair to Good; appears to be chronic condition over several years  Goals:   Short Term Goals:   1. Patient will demonstrate independent HEP to promote increased strength and ROM for improved functional mobility skills.    2. Patient will demonstrate increased B knee strength to grossly 4+/5 to promote improved functional mobility skills.    3. Patient will demonstrate improved R knee AROM extension to 0 degrees.     Short term goal time span:  2-4 weeks      Long Term Goals:    1. Patient will demonstrate B knee strength to grossly 5/5 to promote improved functional mobility skills.    2. Patient will demonstrate improved WOMAC and Oswestry score to indicate increased quality of life.     3. Patient will report ability to return to light fitness activities without increased pain.    4. Patient will report generalized decreased pain by 50% or more during daily activities to promote increased quality of life.   Long term goal time span:  6-8 weeks    Plan:   Therapy options:  Physical therapy treatment to continue  Planned therapy interventions:  Neuromuscular Re-education (CPT 45023), Manual Therapy (CPT 97221), Therapeutic Exercise (CPT 61364), Therapeutic Activities (CPT 92762), E Stim Unattended (CPT 31913) and Hot or Cold Pack Therapy (CPT  79907)  Frequency: 1-2x/week.  Duration in weeks:  6  Discussed with:  Patient      Functional Assessment Used  Oswestry Low Back Pain Disability Total Score: 34  WOMAC Grand Total: 65.63     Referring provider co-signature:  I have reviewed this plan of care and my co-signature certifies the need for services.    Certification Period: 07/21/2023 to  09/15/23    Physician Signature: ________________________________ Date: ______________

## 2023-08-04 ENCOUNTER — PHYSICAL THERAPY (OUTPATIENT)
Dept: PHYSICAL THERAPY | Facility: REHABILITATION | Age: 48
End: 2023-08-04
Attending: STUDENT IN AN ORGANIZED HEALTH CARE EDUCATION/TRAINING PROGRAM
Payer: COMMERCIAL

## 2023-08-04 DIAGNOSIS — M25.562 CHRONIC PAIN OF BOTH KNEES: ICD-10-CM

## 2023-08-04 DIAGNOSIS — G89.29 CHRONIC PAIN OF BOTH KNEES: ICD-10-CM

## 2023-08-04 DIAGNOSIS — M25.561 CHRONIC PAIN OF BOTH KNEES: ICD-10-CM

## 2023-08-04 PROCEDURE — 97110 THERAPEUTIC EXERCISES: CPT

## 2023-08-04 NOTE — OP THERAPY DAILY TREATMENT
"  Outpatient Physical Therapy  DAILY TREATMENT     [facility] [address]  [address]  [place] [address]  Phone:  [phone]  Fax:  [phone]    Date: 08/04/2023    Patient: Ely MICHAEL  YOB: 1975  MRN: [id number]     Time Calculation    Start time: 0930  Stop time: 1012 Time Calculation (min): 42 minutes         Chief Complaint: Knee Problem    Visit #: 2    SUBJECTIVE:  Patient reports that she elevated her knees last night which helped as she had done a lot of walking over the last two days.     OBJECTIVE:  Current objective measures:           Therapeutic Exercises (CPT 86879):     1. quad sets, x15, 3 second holds, B    2. Heel slides, x15, B    3. SAQ, x20, B; small bolster    4. SLR, 2x5, B, 1 second holds, fatigues quickly    5. hip abduction, x15, R LE; x8 L LE, L LE increased fatigue and \"achiness\" to hip    6. hamstring sets, x15, B, 3 second holds    7. hamstring curls with swiss ball, x15, TrA bracing    8. TrA bracing, x15, x3 second holds    20. PN due 8/21; Recert due 9/15      Therapeutic Exercise Summary: Access Code: [email or web address]  URL: [email or web address]  Date: 08/04/2023  Prepared by: Dinah Ge    Exercises  - Supine Quad Set  - 2 x daily - 1 sets - 15 reps - 3 seconds hold  - Supine Heel Slides  - 2 x daily - 1 sets - 15 reps  - Supine Knee Extension Strengthening  - 2 x daily - 1 sets - 15 reps - 2 seconds hold  - Small Range Straight Leg Raise  - 2 x daily - 2 sets - 5 reps - 1 seconds hold  - Supine Isometric Hamstring Set  - 2 x daily - 1 sets - 15 reps - 3 seconds hold  - Sidelying Hip Abduction  - 2 x daily - 1 sets - 15 reps      Time-based treatments/modalities:    Physical Therapy Timed Treatment Charges  Therapeutic exercise minutes (CPT 90698): 42 minutes      Pain rating (1-10) before treatment:  7; B knees  Pain rating (1-10) after treatment:  5, B knees    ASSESSMENT:   Response to treatment: Patient tolerates " PT treatment well today. Reports fatigue with initiation of exercises. Reports B knees continue to pop and catch with patient reporting L seems to be popping and catching more than R. Updated HEP to further strengthening for improved symptoms.     PLAN/RECOMMENDATIONS:   Plan for treatment: therapy treatment to continue next visit.  Planned interventions for next visit: continue with current treatment.

## 2023-08-08 ENCOUNTER — TELEMEDICINE (OUTPATIENT)
Dept: MEDICAL GROUP | Facility: MEDICAL CENTER | Age: 48
End: 2023-08-08
Payer: COMMERCIAL

## 2023-08-08 VITALS — RESPIRATION RATE: 14 BRPM | WEIGHT: 213 LBS | BODY MASS INDEX: 37.74 KG/M2 | HEIGHT: 63 IN

## 2023-08-08 DIAGNOSIS — M25.552 LEFT HIP PAIN: ICD-10-CM

## 2023-08-08 DIAGNOSIS — R23.8 SCALP IRRITATION: ICD-10-CM

## 2023-08-08 DIAGNOSIS — G89.29 CHRONIC MIDLINE LOW BACK PAIN WITHOUT SCIATICA: ICD-10-CM

## 2023-08-08 DIAGNOSIS — E03.9 ACQUIRED HYPOTHYROIDISM: ICD-10-CM

## 2023-08-08 DIAGNOSIS — M54.50 CHRONIC MIDLINE LOW BACK PAIN WITHOUT SCIATICA: ICD-10-CM

## 2023-08-08 PROCEDURE — 99214 OFFICE O/P EST MOD 30 MIN: CPT | Mod: 95 | Performed by: STUDENT IN AN ORGANIZED HEALTH CARE EDUCATION/TRAINING PROGRAM

## 2023-08-08 ASSESSMENT — FIBROSIS 4 INDEX: FIB4 SCORE: 0.52

## 2023-08-08 NOTE — PROGRESS NOTES
Virtual Visit: Established Patient   This visit was conducted via Zoom using secure and encrypted videoconferencing technology.   The patient was in their home in the [facility].    The patient's identity was confirmed and verbal consent was obtained for this virtual visit.    Subjective:   CC:   Chief Complaint   Patient presents with    Referral Needed     Endocrinology, Physical Therapy (Hip)  and Dermatology      [facility] Jen MICHAEL is a 47 y.o. female presenting for evaluation and management of:    Problem   Chronic Midline Low Back Pain Without Sciatica    This is a chronic condition.  She is requesting referral to physical therapy to address this     Scalp Irritation    This is a chronic condition.  She has scalp irritation and wants to see a dermatologist to see if there is anything she can do about it.     Acquired Hypothyroidism    This is a chronic condition, currently feeling like she is not well controlled. Currently on 75 mcg.  Her recent TSH and T4 were normal.  She was referred to endocrinology but they are not able to get her in for several months.  She is requesting that we reroute the referral.     Left Hip Pain    This is a chronic problem that started several months ago.  She stated that she was laying down and went to get up and had acute pain in the left hip.  Since then the hip has been tight and she has been having difficulty moving it.  The pain is in the hip joint and going down the groin.  She is requesting referral to physical therapy to address this.       Current medicines (including changes today)  Current Outpatient Medications   Medication Sig Dispense Refill    lansoprazole (PREVACID) 30 MG CAPSULE DELAYED RELEASE 1 capsule before a meal      cyclobenzaprine (FLEXERIL) 5 mg tablet Take 1 Tablet by mouth 3 times a day as needed for Muscle Spasms or Moderate Pain. 30 Tablet 0    Estradiol (LILA) 0.025 MG/24HR PATCH BIWEEKLY Apply one patch twice weekly for menopausal symptoms 8  "Patch 5    levothyroxine (SYNTHROID) 75 MCG Tab Take 75 mcg by mouth every morning on an empty stomach.      ALPRAZolam (XANAX) 1 MG Tab Take 2 mg by mouth at bedtime as needed for Sleep.       No current facility-administered medications for this visit.       Patient Active Problem List    Diagnosis Date Noted    Chronic midline low back pain without sciatica 08/08/2023    Scalp irritation 08/08/2023    Gastroesophageal reflux disease without esophagitis 06/16/2023    Anxiety 06/16/2023    Acquired hypothyroidism 06/16/2023    Restless leg 06/16/2023    Left hip pain 06/16/2023    Chronic pain of both knees 06/16/2023    Anemia 06/16/2023        Objective:   Resp 14   Ht 1.6 m (5' 3\")   Wt 96.6 kg (213 lb)   BMI 37.73 kg/m²     Physical Exam:  Constitutional: Alert, no distress, well-groomed.  Skin: No rashes in visible areas.  Eye: Round. Conjunctiva clear, lids normal. No icterus.   ENMT: Lips pink without lesions, good dentition, moist mucous membranes. Phonation normal.  Neck: No masses, no thyromegaly. Moves freely without pain.  Respiratory: Unlabored respiratory effort, no cough or audible wheeze  Psych: Alert and oriented x3, normal affect and mood.     Assessment and Plan:   The following treatment plan was discussed:     1. Left hip pain  2. Chronic midline low back pain without sciatica  Referral to physical therapy sent to address hip pain and back pain.  - Referral to Physical Therapy    3. Acquired hypothyroidism  I have rerouted her referral over to [facility].  Her hypothyroidism does seem to be well-controlled based on labs.  I will continuing her Synthroid at current dosing  - Referral to Endocrinology    4. Scalp irritation  New condition, referral sent at patient request  - Referral to Dermatology    Please note that this dictation was created using voice recognition software. I have made every reasonable attempt to correct obvious errors, but I expect that there are errors of grammar and possibly " content that I did not discover before finalizing the note.      Follow-up: No follow-ups on file.

## 2023-08-18 ENCOUNTER — APPOINTMENT (OUTPATIENT)
Dept: PHYSICAL THERAPY | Facility: REHABILITATION | Age: 48
End: 2023-08-18
Attending: STUDENT IN AN ORGANIZED HEALTH CARE EDUCATION/TRAINING PROGRAM
Payer: COMMERCIAL

## 2023-08-18 ENCOUNTER — TELEPHONE (OUTPATIENT)
Dept: PHYSICAL THERAPY | Facility: REHABILITATION | Age: 48
End: 2023-08-18
Payer: COMMERCIAL

## 2023-08-18 NOTE — OP THERAPY DISCHARGE SUMMARY
Outpatient Physical Therapy  DISCHARGE SUMMARY NOTE      [facility] Physical Therapy [facility]  [address]  [address]  [place] [address]  Phone:  [phone]  Fax:  [phone]    Date of Visit: 08/18/2023    Patient: Ely MICHAEL  YOB: 1975  MRN: [id number]     Referring Provider: Myesha Stone M.D.  [address]  [address]  [place] [address]   Referring Diagnosis Chronic pain of both knees [M25.561, M25.562, G89.29]         Functional Assessment Used: n/a on D/C        Your patient is being discharged from Physical Therapy with the following comments:   Patient cancelled or missed more than 2 scheduled appointments/non-compliant    Comments:  Patient seen for evaluation and x1 follow up. Has no showed or late cancelled x3 follow ups. D/C per attendance policy at this time.     Limitations Remaining:  B knee pain remained at last follow up visit. Unknown at this time secondary to last follow up on 8/4/2023.     Recommendations:  Follow up with MD. Retrieve new referral if/when patient is able to attend treatment sessions, and as medically necessary.     Thank you for allowing me to participate in this patient's care.       Dinah Ge, PT    Date: 8/18/2023

## 2023-08-25 ENCOUNTER — APPOINTMENT (OUTPATIENT)
Dept: PHYSICAL THERAPY | Facility: REHABILITATION | Age: 48
End: 2023-08-25
Attending: STUDENT IN AN ORGANIZED HEALTH CARE EDUCATION/TRAINING PROGRAM
Payer: COMMERCIAL

## 2023-09-19 PROCEDURE — RXMED WILLOW AMBULATORY MEDICATION CHARGE: Performed by: OBSTETRICS & GYNECOLOGY

## 2023-09-24 ENCOUNTER — PHARMACY VISIT (OUTPATIENT)
Dept: PHARMACY | Facility: MEDICAL CENTER | Age: 48
End: 2023-09-24
Payer: COMMERCIAL

## 2023-10-02 ENCOUNTER — IMMUNIZATION (OUTPATIENT)
Dept: OCCUPATIONAL MEDICINE | Facility: CLINIC | Age: 48
End: 2023-10-02

## 2023-10-02 DIAGNOSIS — Z23 NEED FOR VACCINATION: Primary | ICD-10-CM

## 2023-10-02 PROCEDURE — 90686 IIV4 VACC NO PRSV 0.5 ML IM: CPT | Performed by: PREVENTIVE MEDICINE

## 2023-11-06 ENCOUNTER — APPOINTMENT (OUTPATIENT)
Dept: MEDICAL GROUP | Facility: MEDICAL CENTER | Age: 48
End: 2023-11-06
Payer: COMMERCIAL

## 2023-11-10 ENCOUNTER — PHARMACY VISIT (OUTPATIENT)
Dept: PHARMACY | Facility: MEDICAL CENTER | Age: 48
End: 2023-11-10
Payer: COMMERCIAL

## 2023-11-10 ENCOUNTER — OFFICE VISIT (OUTPATIENT)
Dept: MEDICAL GROUP | Facility: MEDICAL CENTER | Age: 48
End: 2023-11-10
Payer: COMMERCIAL

## 2023-11-10 ENCOUNTER — APPOINTMENT (OUTPATIENT)
Dept: MEDICAL GROUP | Facility: MEDICAL CENTER | Age: 48
End: 2023-11-10
Payer: COMMERCIAL

## 2023-11-10 ENCOUNTER — HOSPITAL ENCOUNTER (OUTPATIENT)
Facility: MEDICAL CENTER | Age: 48
End: 2023-11-10
Attending: STUDENT IN AN ORGANIZED HEALTH CARE EDUCATION/TRAINING PROGRAM
Payer: COMMERCIAL

## 2023-11-10 VITALS
TEMPERATURE: 97.6 F | OXYGEN SATURATION: 100 % | HEIGHT: 63 IN | BODY MASS INDEX: 37.21 KG/M2 | DIASTOLIC BLOOD PRESSURE: 76 MMHG | SYSTOLIC BLOOD PRESSURE: 120 MMHG | HEART RATE: 69 BPM | WEIGHT: 210 LBS

## 2023-11-10 DIAGNOSIS — E03.9 ACQUIRED HYPOTHYROIDISM: ICD-10-CM

## 2023-11-10 DIAGNOSIS — F41.9 ANXIETY: ICD-10-CM

## 2023-11-10 DIAGNOSIS — Z79.899 HIGH RISK MEDICATION USE: ICD-10-CM

## 2023-11-10 PROCEDURE — 3074F SYST BP LT 130 MM HG: CPT | Performed by: STUDENT IN AN ORGANIZED HEALTH CARE EDUCATION/TRAINING PROGRAM

## 2023-11-10 PROCEDURE — 3078F DIAST BP <80 MM HG: CPT | Performed by: STUDENT IN AN ORGANIZED HEALTH CARE EDUCATION/TRAINING PROGRAM

## 2023-11-10 PROCEDURE — 80307 DRUG TEST PRSMV CHEM ANLYZR: CPT

## 2023-11-10 PROCEDURE — RXMED WILLOW AMBULATORY MEDICATION CHARGE: Performed by: STUDENT IN AN ORGANIZED HEALTH CARE EDUCATION/TRAINING PROGRAM

## 2023-11-10 PROCEDURE — 99214 OFFICE O/P EST MOD 30 MIN: CPT | Performed by: STUDENT IN AN ORGANIZED HEALTH CARE EDUCATION/TRAINING PROGRAM

## 2023-11-10 PROCEDURE — G0480 DRUG TEST DEF 1-7 CLASSES: HCPCS

## 2023-11-10 RX ORDER — ALBUTEROL SULFATE 90 UG/1
2 AEROSOL, METERED RESPIRATORY (INHALATION) EVERY 6 HOURS PRN
COMMUNITY

## 2023-11-10 RX ORDER — ALPRAZOLAM 1 MG/1
2 TABLET ORAL NIGHTLY PRN
Qty: 60 TABLET | Refills: 2 | Status: SHIPPED | OUTPATIENT
Start: 2023-11-10 | End: 2024-02-14

## 2023-11-10 ASSESSMENT — FIBROSIS 4 INDEX: FIB4 SCORE: 0.53

## 2023-11-10 NOTE — PROGRESS NOTES
"Subjective:     CC: Anxiety, hpthyroidism    HPI:   [facility] presents today with    Problem   Anxiety    This is a chronic condition.  Currently well controlled with Xanax 2 mg nightly as needed.  Controlled substance agreement completed today.  Urine drug recompleted today.  Unfortunately due to systemwide error I was not able to review the PDMP.     Acquired Hypothyroidism    This is a chronic condition, currently feeling like she is not well controlled. Currently on 75 mcg.  Her recent TSH and T4 were normal.  She has seen endocrinology at [facility] and they have switched her to brand-name Synthroid.  She thinks that she has been doing better since then.  They also found a bunch of thyroid nodules that she will be following up with them in the next couple weeks.       ROS:  ROS    Objective:     Exam:  /76 (BP Location: Left arm, Patient Position: Sitting, BP Cuff Size: Adult)   Pulse 69   Temp 36.4 °C (97.6 °F) (Temporal)   Ht 1.6 m (5' 3\")   Wt 95.3 kg (210 lb)   SpO2 100%   BMI 37.20 kg/m²  Body mass index is 37.2 kg/m².    Physical Exam  Vitals reviewed.   Constitutional:       General: She is not in acute distress.     Appearance: She is not toxic-appearing.      Comments: Exam through observation only   HENT:      Head: Normocephalic and atraumatic.      Right Ear: External ear normal.      Left Ear: External ear normal.   Eyes:      General:         Right eye: No discharge.         Left eye: No discharge.      Extraocular Movements: Extraocular movements intact.      Conjunctiva/sclera: Conjunctivae normal.   Pulmonary:      Effort: Pulmonary effort is normal. No respiratory distress.   Skin:     General: Skin is warm and dry.   Neurological:      Mental Status: She is alert.   Psychiatric:         Mood and Affect: Mood normal.         Behavior: Behavior normal.         Thought Content: Thought content normal.         Judgment: Judgment normal.           Assessment & Plan:     48 y.o. female with the " following -     1. Anxiety  Controlled substance agreement completed today.  Urine drug screen completed today.  PDMP unable to be reviewed due to systemwide error.  Refill given  - Controlled Substance Treatment Agreement  - ALPRAZolam (XANAX) 1 MG Tab; Take 2 Tablets by mouth at bedtime as needed for Sleep for up to 90 days. Indications: Feeling Anxious  Dispense: 60 Tablet; Refill: 2    2. High risk medication use  - URINE DRUG SCREEN W/CONF (AR); Future    3. Acquired hypothyroidism  Continue to follow with endocrinology, I am glad she is doing better on brand-name Synthroid    No follow-ups on file.    Please note that this dictation was created using voice recognition software. I have made every reasonable attempt to correct obvious errors, but I expect that there are errors of grammar and possibly content that I did not discover before finalizing the note.

## 2023-11-12 LAB
AMPHET CTO UR CFM-MCNC: NEGATIVE NG/ML
BARBITURATES CTO UR CFM-MCNC: NEGATIVE NG/ML
BENZODIAZ CTO UR CFM-MCNC: POSITIVE NG/ML
CANNABINOIDS CTO UR CFM-MCNC: POSITIVE NG/ML
COCAINE CTO UR CFM-MCNC: NEGATIVE NG/ML
DRUG COMMENT 753798: NORMAL
METHADONE CTO UR CFM-MCNC: NEGATIVE NG/ML
OPIATES CTO UR CFM-MCNC: NEGATIVE NG/ML
PCP CTO UR CFM-MCNC: NEGATIVE NG/ML
PROPOXYPH CTO UR CFM-MCNC: NEGATIVE NG/ML

## 2023-11-13 DIAGNOSIS — Z79.899 HIGH RISK MEDICATION USE: ICD-10-CM

## 2023-11-14 LAB
1OH-MIDAZOLAM UR CFM-MCNC: <20 NG/ML
7AMINOCLONAZEPAM UR CFM-MCNC: <5 NG/ML
A-OH ALPRAZ UR CFM-MCNC: 123 NG/ML
ALPRAZ UR CFM-MCNC: 24 NG/ML
CHLORDIAZEP UR CFM-MCNC: <20 NG/ML
CLONAZEPAM UR CFM-MCNC: <5 NG/ML
DIAZEPAM UR CFM-MCNC: <20 NG/ML
LORAZEPAM UR CFM-MCNC: <20 NG/ML
MIDAZOLAM UR CFM-MCNC: <20 NG/ML
NORDIAZEPAM UR CFM-MCNC: <20 NG/ML
OXAZEPAM UR CFM-MCNC: <20 NG/ML
TEMAZEPAM UR CFM-MCNC: <20 NG/ML
THC UR CFM-MCNC: >500 NG/ML

## 2023-11-14 PROCEDURE — G0480 DRUG TEST DEF 1-7 CLASSES: HCPCS

## 2023-11-17 ENCOUNTER — HOSPITAL ENCOUNTER (OUTPATIENT)
Dept: LAB | Facility: MEDICAL CENTER | Age: 48
End: 2023-11-17
Attending: INTERNAL MEDICINE
Payer: COMMERCIAL

## 2023-11-17 LAB
25(OH)D3 SERPL-MCNC: 12 NG/ML (ref 30–100)
DHEA-S SERPL-MCNC: 123 UG/DL (ref 35.4–256)
ESTRADIOL SERPL-MCNC: 8.8 PG/ML
FSH SERPL-ACNC: 38.1 MIU/ML
LH SERPL-ACNC: 32.2 IU/L
T3FREE SERPL-MCNC: 3 PG/ML (ref 2–4.4)
T4 FREE SERPL-MCNC: 1.11 NG/DL (ref 0.93–1.7)
TESTOST SERPL-MCNC: 24 NG/DL (ref 9–75)
THYROPEROXIDASE AB SERPL-ACNC: 294 IU/ML (ref 0–9)
TSH SERPL DL<=0.005 MIU/L-ACNC: 1.25 UIU/ML (ref 0.38–5.33)
VIT B12 SERPL-MCNC: 447 PG/ML (ref 211–911)

## 2023-11-17 PROCEDURE — 82626 DEHYDROEPIANDROSTERONE: CPT

## 2023-11-17 PROCEDURE — 84270 ASSAY OF SEX HORMONE GLOBUL: CPT

## 2023-11-17 PROCEDURE — 86376 MICROSOMAL ANTIBODY EACH: CPT

## 2023-11-17 PROCEDURE — 84439 ASSAY OF FREE THYROXINE: CPT

## 2023-11-17 PROCEDURE — 82157 ASSAY OF ANDROSTENEDIONE: CPT

## 2023-11-17 PROCEDURE — 82607 VITAMIN B-12: CPT

## 2023-11-17 PROCEDURE — 84481 FREE ASSAY (FT-3): CPT

## 2023-11-17 PROCEDURE — 36415 COLL VENOUS BLD VENIPUNCTURE: CPT

## 2023-11-17 PROCEDURE — 82670 ASSAY OF TOTAL ESTRADIOL: CPT

## 2023-11-17 PROCEDURE — 84443 ASSAY THYROID STIM HORMONE: CPT

## 2023-11-17 PROCEDURE — 84403 ASSAY OF TOTAL TESTOSTERONE: CPT

## 2023-11-17 PROCEDURE — 82627 DEHYDROEPIANDROSTERONE: CPT

## 2023-11-17 PROCEDURE — 83001 ASSAY OF GONADOTROPIN (FSH): CPT

## 2023-11-17 PROCEDURE — 82306 VITAMIN D 25 HYDROXY: CPT

## 2023-11-17 PROCEDURE — 83002 ASSAY OF GONADOTROPIN (LH): CPT

## 2023-11-18 LAB — SHBG SERPL-SCNC: 42 NMOL/L (ref 25–122)

## 2023-11-20 LAB
ANDROST SERPL-MCNC: 0.74 NG/ML (ref 0.13–0.82)
DHEA SERPL-MCNC: 4.64 NG/ML (ref 0.63–4.7)

## 2023-12-12 PROCEDURE — RXMED WILLOW AMBULATORY MEDICATION CHARGE: Performed by: STUDENT IN AN ORGANIZED HEALTH CARE EDUCATION/TRAINING PROGRAM

## 2023-12-13 ENCOUNTER — PHARMACY VISIT (OUTPATIENT)
Dept: PHARMACY | Facility: MEDICAL CENTER | Age: 48
End: 2023-12-13
Payer: COMMERCIAL

## 2024-01-15 ENCOUNTER — PHARMACY VISIT (OUTPATIENT)
Dept: PHARMACY | Facility: MEDICAL CENTER | Age: 49
End: 2024-01-15
Payer: COMMERCIAL

## 2024-01-15 PROCEDURE — RXMED WILLOW AMBULATORY MEDICATION CHARGE: Performed by: STUDENT IN AN ORGANIZED HEALTH CARE EDUCATION/TRAINING PROGRAM

## 2024-01-22 ENCOUNTER — HOSPITAL ENCOUNTER (OUTPATIENT)
Dept: LAB | Facility: MEDICAL CENTER | Age: 49
End: 2024-01-22
Attending: INTERNAL MEDICINE
Payer: COMMERCIAL

## 2024-01-22 LAB
T3FREE SERPL-MCNC: 2.81 PG/ML (ref 2–4.4)
T4 FREE SERPL-MCNC: 1.41 NG/DL (ref 0.93–1.7)
TSH SERPL DL<=0.005 MIU/L-ACNC: 0.69 UIU/ML (ref 0.38–5.33)

## 2024-01-22 PROCEDURE — 84481 FREE ASSAY (FT-3): CPT

## 2024-01-22 PROCEDURE — 84443 ASSAY THYROID STIM HORMONE: CPT

## 2024-01-22 PROCEDURE — 36415 COLL VENOUS BLD VENIPUNCTURE: CPT

## 2024-01-22 PROCEDURE — 84439 ASSAY OF FREE THYROXINE: CPT

## 2024-01-22 PROCEDURE — RXMED WILLOW AMBULATORY MEDICATION CHARGE: Performed by: INTERNAL MEDICINE

## 2024-01-23 ENCOUNTER — PHARMACY VISIT (OUTPATIENT)
Dept: PHARMACY | Facility: MEDICAL CENTER | Age: 49
End: 2024-01-23
Payer: COMMERCIAL

## 2024-01-23 RX ORDER — ESTRADIOL 0.03 MG/D
FILM, EXTENDED RELEASE TRANSDERMAL
Qty: 10 PATCH | Refills: 3 | OUTPATIENT
Start: 2024-01-23

## 2024-01-23 RX ORDER — LEVOTHYROXINE SODIUM 88 UG/1
TABLET ORAL
Qty: 90 TABLET | Refills: 3 | OUTPATIENT
Start: 2024-01-23

## 2024-01-23 RX ORDER — ERGOCALCIFEROL 1.25 MG/1
CAPSULE ORAL
Qty: 12 CAPSULE | Refills: 3 | Status: SHIPPED | OUTPATIENT
Start: 2024-01-23 | End: 2024-02-29

## 2024-02-16 ENCOUNTER — HOSPITAL ENCOUNTER (OUTPATIENT)
Facility: MEDICAL CENTER | Age: 49
End: 2024-02-16
Attending: STUDENT IN AN ORGANIZED HEALTH CARE EDUCATION/TRAINING PROGRAM
Payer: COMMERCIAL

## 2024-02-16 ENCOUNTER — APPOINTMENT (OUTPATIENT)
Dept: LAB | Facility: MEDICAL CENTER | Age: 49
End: 2024-02-16
Attending: STUDENT IN AN ORGANIZED HEALTH CARE EDUCATION/TRAINING PROGRAM
Payer: COMMERCIAL

## 2024-02-16 DIAGNOSIS — Z79.899 HIGH RISK MEDICATION USE: ICD-10-CM

## 2024-02-16 PROCEDURE — 80307 DRUG TEST PRSMV CHEM ANLYZR: CPT

## 2024-02-16 PROCEDURE — G0480 DRUG TEST DEF 1-7 CLASSES: HCPCS

## 2024-02-17 LAB
AMPHET CTO UR CFM-MCNC: NEGATIVE NG/ML
BARBITURATES CTO UR CFM-MCNC: NEGATIVE NG/ML
BENZODIAZ CTO UR CFM-MCNC: NORMAL NG/ML
CANNABINOIDS CTO UR CFM-MCNC: NEGATIVE NG/ML
COCAINE CTO UR CFM-MCNC: NEGATIVE NG/ML
CREAT UR-MCNC: 56.8 MG/DL (ref 20–400)
DRUG COMMENT 753798: NORMAL
METHADONE CTO UR CFM-MCNC: NEGATIVE NG/ML
OPIATES CTO UR CFM-MCNC: NEGATIVE NG/ML
PCP CTO UR CFM-MCNC: NEGATIVE NG/ML
PROPOXYPH CTO UR CFM-MCNC: NEGATIVE NG/ML

## 2024-02-23 LAB
1OH-MIDAZOLAM UR CFM-MCNC: <20 NG/ML
7AMINOCLONAZEPAM UR CFM-MCNC: <5 NG/ML
A-OH ALPRAZ UR CFM-MCNC: <5 NG/ML
ALPRAZ UR CFM-MCNC: >1000 NG/ML
CHLORDIAZEP UR CFM-MCNC: <20 NG/ML
CLONAZEPAM UR CFM-MCNC: <5 NG/ML
DIAZEPAM UR CFM-MCNC: <20 NG/ML
LORAZEPAM UR CFM-MCNC: <20 NG/ML
MIDAZOLAM UR CFM-MCNC: <20 NG/ML
NORDIAZEPAM UR CFM-MCNC: <20 NG/ML
OXAZEPAM UR CFM-MCNC: <20 NG/ML
TEMAZEPAM UR CFM-MCNC: <20 NG/ML

## 2024-02-23 PROCEDURE — G0480 DRUG TEST DEF 1-7 CLASSES: HCPCS

## 2024-02-29 ENCOUNTER — PHARMACY VISIT (OUTPATIENT)
Dept: PHARMACY | Facility: MEDICAL CENTER | Age: 49
End: 2024-02-29
Payer: COMMERCIAL

## 2024-02-29 ENCOUNTER — OFFICE VISIT (OUTPATIENT)
Dept: MEDICAL GROUP | Facility: MEDICAL CENTER | Age: 49
End: 2024-02-29
Payer: COMMERCIAL

## 2024-02-29 VITALS
BODY MASS INDEX: 38.62 KG/M2 | TEMPERATURE: 97.1 F | HEART RATE: 73 BPM | SYSTOLIC BLOOD PRESSURE: 122 MMHG | DIASTOLIC BLOOD PRESSURE: 66 MMHG | RESPIRATION RATE: 16 BRPM | HEIGHT: 63 IN | WEIGHT: 218 LBS

## 2024-02-29 DIAGNOSIS — K21.9 GASTROESOPHAGEAL REFLUX DISEASE WITHOUT ESOPHAGITIS: ICD-10-CM

## 2024-02-29 DIAGNOSIS — F41.9 ANXIETY: ICD-10-CM

## 2024-02-29 PROCEDURE — 3078F DIAST BP <80 MM HG: CPT | Performed by: STUDENT IN AN ORGANIZED HEALTH CARE EDUCATION/TRAINING PROGRAM

## 2024-02-29 PROCEDURE — 99214 OFFICE O/P EST MOD 30 MIN: CPT | Performed by: STUDENT IN AN ORGANIZED HEALTH CARE EDUCATION/TRAINING PROGRAM

## 2024-02-29 PROCEDURE — 3074F SYST BP LT 130 MM HG: CPT | Performed by: STUDENT IN AN ORGANIZED HEALTH CARE EDUCATION/TRAINING PROGRAM

## 2024-02-29 PROCEDURE — RXMED WILLOW AMBULATORY MEDICATION CHARGE: Performed by: INTERNAL MEDICINE

## 2024-02-29 RX ORDER — LANSOPRAZOLE 30 MG/1
CAPSULE, DELAYED RELEASE ORAL
Qty: 90 CAPSULE | Refills: 1 | Status: SHIPPED | OUTPATIENT
Start: 2024-02-29

## 2024-02-29 RX ORDER — ALPRAZOLAM 1 MG/1
1 TABLET ORAL 3 TIMES DAILY PRN
Qty: 90 TABLET | Refills: 2 | Status: SHIPPED | OUTPATIENT
Start: 2024-02-29 | End: 2024-05-30

## 2024-02-29 ASSESSMENT — FIBROSIS 4 INDEX: FIB4 SCORE: 0.53

## 2024-03-01 ENCOUNTER — APPOINTMENT (OUTPATIENT)
Dept: ADMISSIONS | Facility: MEDICAL CENTER | Age: 49
End: 2024-03-01
Attending: SURGERY
Payer: COMMERCIAL

## 2024-03-01 ENCOUNTER — PHARMACY VISIT (OUTPATIENT)
Dept: PHARMACY | Facility: MEDICAL CENTER | Age: 49
End: 2024-03-01
Payer: COMMERCIAL

## 2024-03-01 PROCEDURE — RXMED WILLOW AMBULATORY MEDICATION CHARGE: Performed by: STUDENT IN AN ORGANIZED HEALTH CARE EDUCATION/TRAINING PROGRAM

## 2024-03-11 ENCOUNTER — PRE-ADMISSION TESTING (OUTPATIENT)
Dept: ADMISSIONS | Facility: MEDICAL CENTER | Age: 49
End: 2024-03-11
Attending: SURGERY
Payer: COMMERCIAL

## 2024-03-13 ENCOUNTER — PRE-ADMISSION TESTING (OUTPATIENT)
Dept: ADMISSIONS | Facility: MEDICAL CENTER | Age: 49
End: 2024-03-13
Attending: SURGERY
Payer: COMMERCIAL

## 2024-03-13 DIAGNOSIS — Z01.812 PRE-OPERATIVE LABORATORY EXAMINATION: ICD-10-CM

## 2024-03-13 LAB
ANION GAP SERPL CALC-SCNC: 9 MMOL/L (ref 7–16)
BUN SERPL-MCNC: 12 MG/DL (ref 8–22)
CALCIUM SERPL-MCNC: 9.1 MG/DL (ref 8.5–10.5)
CHLORIDE SERPL-SCNC: 105 MMOL/L (ref 96–112)
CO2 SERPL-SCNC: 27 MMOL/L (ref 20–33)
CREAT SERPL-MCNC: 0.82 MG/DL (ref 0.5–1.4)
ERYTHROCYTE [DISTWIDTH] IN BLOOD BY AUTOMATED COUNT: 43.5 FL (ref 35.9–50)
EST. AVERAGE GLUCOSE BLD GHB EST-MCNC: 117 MG/DL
GFR SERPLBLD CREATININE-BSD FMLA CKD-EPI: 88 ML/MIN/1.73 M 2
GLUCOSE SERPL-MCNC: 118 MG/DL (ref 65–99)
HBA1C MFR BLD: 5.7 % (ref 4–5.6)
HCT VFR BLD AUTO: 42 % (ref 37–47)
HGB BLD-MCNC: 13.8 G/DL (ref 12–16)
MCH RBC QN AUTO: 27.3 PG (ref 27–33)
MCHC RBC AUTO-ENTMCNC: 32.9 G/DL (ref 32.2–35.5)
MCV RBC AUTO: 83.2 FL (ref 81.4–97.8)
PLATELET # BLD AUTO: 249 K/UL (ref 164–446)
PMV BLD AUTO: 10.4 FL (ref 9–12.9)
POTASSIUM SERPL-SCNC: 4.1 MMOL/L (ref 3.6–5.5)
RBC # BLD AUTO: 5.05 M/UL (ref 4.2–5.4)
SODIUM SERPL-SCNC: 141 MMOL/L (ref 135–145)
WBC # BLD AUTO: 5.7 K/UL (ref 4.8–10.8)

## 2024-03-13 PROCEDURE — 83036 HEMOGLOBIN GLYCOSYLATED A1C: CPT

## 2024-03-13 PROCEDURE — 36415 COLL VENOUS BLD VENIPUNCTURE: CPT

## 2024-03-13 PROCEDURE — 84432 ASSAY OF THYROGLOBULIN: CPT

## 2024-03-13 PROCEDURE — 85027 COMPLETE CBC AUTOMATED: CPT

## 2024-03-13 PROCEDURE — 80048 BASIC METABOLIC PNL TOTAL CA: CPT

## 2024-03-13 PROCEDURE — 86800 THYROGLOBULIN ANTIBODY: CPT

## 2024-03-15 ENCOUNTER — APPOINTMENT (OUTPATIENT)
Dept: MEDICAL GROUP | Facility: MEDICAL CENTER | Age: 49
End: 2024-03-15
Payer: COMMERCIAL

## 2024-03-15 LAB
THYROGLOB AB SERPL-ACNC: 2.1 IU/ML (ref 0–4)
THYROGLOB SERPL-MCNC: 624.8 NG/ML (ref 1.3–31.8)
THYROGLOB SERPL-MCNC: ABNORMAL NG/ML (ref 1.3–31.8)

## 2024-04-01 ENCOUNTER — APPOINTMENT (OUTPATIENT)
Dept: MEDICAL GROUP | Facility: MEDICAL CENTER | Age: 49
End: 2024-04-01
Payer: COMMERCIAL

## 2024-04-02 ENCOUNTER — ANESTHESIA EVENT (OUTPATIENT)
Dept: SURGERY | Facility: MEDICAL CENTER | Age: 49
End: 2024-04-02
Payer: COMMERCIAL

## 2024-04-02 ENCOUNTER — PHARMACY VISIT (OUTPATIENT)
Dept: PHARMACY | Facility: MEDICAL CENTER | Age: 49
End: 2024-04-02
Payer: COMMERCIAL

## 2024-04-02 PROCEDURE — RXMED WILLOW AMBULATORY MEDICATION CHARGE: Performed by: STUDENT IN AN ORGANIZED HEALTH CARE EDUCATION/TRAINING PROGRAM

## 2024-04-03 ENCOUNTER — ANESTHESIA (OUTPATIENT)
Dept: SURGERY | Facility: MEDICAL CENTER | Age: 49
End: 2024-04-03
Payer: COMMERCIAL

## 2024-04-03 ENCOUNTER — HOSPITAL ENCOUNTER (OUTPATIENT)
Facility: MEDICAL CENTER | Age: 49
End: 2024-04-04
Attending: SURGERY | Admitting: SURGERY
Payer: COMMERCIAL

## 2024-04-03 DIAGNOSIS — Z01.812 PRE-OPERATIVE LABORATORY EXAMINATION: ICD-10-CM

## 2024-04-03 DIAGNOSIS — G89.18 POSTOPERATIVE PAIN: ICD-10-CM

## 2024-04-03 PROBLEM — E04.2 NONTOXIC MULTINODULAR GOITER: Status: ACTIVE | Noted: 2024-04-03

## 2024-04-03 LAB
ALBUMIN SERPL BCP-MCNC: 3.9 G/DL (ref 3.2–4.9)
CALCIUM SERPL-MCNC: 8.2 MG/DL (ref 8.5–10.5)
PATHOLOGY CONSULT NOTE: NORMAL

## 2024-04-03 PROCEDURE — 82310 ASSAY OF CALCIUM: CPT | Mod: 91

## 2024-04-03 PROCEDURE — 700102 HCHG RX REV CODE 250 W/ 637 OVERRIDE(OP): Performed by: ANESTHESIOLOGY

## 2024-04-03 PROCEDURE — 700101 HCHG RX REV CODE 250: Performed by: ANESTHESIOLOGY

## 2024-04-03 PROCEDURE — 160009 HCHG ANES TIME/MIN: Performed by: SURGERY

## 2024-04-03 PROCEDURE — 700102 HCHG RX REV CODE 250 W/ 637 OVERRIDE(OP): Performed by: SURGERY

## 2024-04-03 PROCEDURE — 700111 HCHG RX REV CODE 636 W/ 250 OVERRIDE (IP): Mod: JZ | Performed by: ANESTHESIOLOGY

## 2024-04-03 PROCEDURE — 700105 HCHG RX REV CODE 258: Performed by: ANESTHESIOLOGY

## 2024-04-03 PROCEDURE — 160036 HCHG PACU - EA ADDL 30 MINS PHASE I: Performed by: SURGERY

## 2024-04-03 PROCEDURE — 160002 HCHG RECOVERY MINUTES (STAT): Performed by: SURGERY

## 2024-04-03 PROCEDURE — A9270 NON-COVERED ITEM OR SERVICE: HCPCS | Performed by: ANESTHESIOLOGY

## 2024-04-03 PROCEDURE — 160041 HCHG SURGERY MINUTES - EA ADDL 1 MIN LEVEL 4: Performed by: SURGERY

## 2024-04-03 PROCEDURE — 96374 THER/PROPH/DIAG INJ IV PUSH: CPT

## 2024-04-03 PROCEDURE — 88307 TISSUE EXAM BY PATHOLOGIST: CPT | Mod: 59

## 2024-04-03 PROCEDURE — 700105 HCHG RX REV CODE 258: Performed by: SURGERY

## 2024-04-03 PROCEDURE — 160035 HCHG PACU - 1ST 60 MINS PHASE I: Performed by: SURGERY

## 2024-04-03 PROCEDURE — G0378 HOSPITAL OBSERVATION PER HR: HCPCS

## 2024-04-03 PROCEDURE — A9270 NON-COVERED ITEM OR SERVICE: HCPCS | Performed by: SURGERY

## 2024-04-03 PROCEDURE — 160048 HCHG OR STATISTICAL LEVEL 1-5: Performed by: SURGERY

## 2024-04-03 PROCEDURE — 88342 IMHCHEM/IMCYTCHM 1ST ANTB: CPT

## 2024-04-03 PROCEDURE — 700111 HCHG RX REV CODE 636 W/ 250 OVERRIDE (IP): Mod: JZ | Performed by: SURGERY

## 2024-04-03 PROCEDURE — 82040 ASSAY OF SERUM ALBUMIN: CPT | Mod: 91

## 2024-04-03 PROCEDURE — 88341 IMHCHEM/IMCYTCHM EA ADD ANTB: CPT

## 2024-04-03 PROCEDURE — 160029 HCHG SURGERY MINUTES - 1ST 30 MINS LEVEL 4: Performed by: SURGERY

## 2024-04-03 RX ORDER — LIDOCAINE HYDROCHLORIDE 20 MG/ML
INJECTION, SOLUTION EPIDURAL; INFILTRATION; INTRACAUDAL; PERINEURAL PRN
Status: DISCONTINUED | OUTPATIENT
Start: 2024-04-03 | End: 2024-04-03 | Stop reason: SURG

## 2024-04-03 RX ORDER — CALCIUM CARBONATE 500 MG/1
2000 TABLET, CHEWABLE ORAL 3 TIMES DAILY
Status: DISCONTINUED | OUTPATIENT
Start: 2024-04-04 | End: 2024-04-04 | Stop reason: HOSPADM

## 2024-04-03 RX ORDER — HALOPERIDOL 5 MG/ML
1 INJECTION INTRAMUSCULAR
Status: DISCONTINUED | OUTPATIENT
Start: 2024-04-03 | End: 2024-04-03 | Stop reason: HOSPADM

## 2024-04-03 RX ORDER — ROPINIROLE 0.5 MG/1
0.5 TABLET, FILM COATED ORAL DAILY
Status: DISCONTINUED | OUTPATIENT
Start: 2024-04-03 | End: 2024-04-03

## 2024-04-03 RX ORDER — ALBUTEROL SULFATE 90 UG/1
2 AEROSOL, METERED RESPIRATORY (INHALATION) EVERY 6 HOURS PRN
Status: DISCONTINUED | OUTPATIENT
Start: 2024-04-03 | End: 2024-04-04 | Stop reason: HOSPADM

## 2024-04-03 RX ORDER — SODIUM CHLORIDE, SODIUM LACTATE, POTASSIUM CHLORIDE, CALCIUM CHLORIDE 600; 310; 30; 20 MG/100ML; MG/100ML; MG/100ML; MG/100ML
INJECTION, SOLUTION INTRAVENOUS CONTINUOUS
Status: DISCONTINUED | OUTPATIENT
Start: 2024-04-03 | End: 2024-04-03 | Stop reason: HOSPADM

## 2024-04-03 RX ORDER — LEVOTHYROXINE SODIUM 0.12 MG/1
125 TABLET ORAL
Status: DISCONTINUED | OUTPATIENT
Start: 2024-04-03 | End: 2024-04-04 | Stop reason: HOSPADM

## 2024-04-03 RX ORDER — KETOROLAC TROMETHAMINE 15 MG/ML
INJECTION, SOLUTION INTRAMUSCULAR; INTRAVENOUS PRN
Status: DISCONTINUED | OUTPATIENT
Start: 2024-04-03 | End: 2024-04-03 | Stop reason: SURG

## 2024-04-03 RX ORDER — CELECOXIB 200 MG/1
400 CAPSULE ORAL ONCE
Status: COMPLETED | OUTPATIENT
Start: 2024-04-03 | End: 2024-04-03

## 2024-04-03 RX ORDER — DIPHENHYDRAMINE HYDROCHLORIDE 50 MG/ML
25 INJECTION INTRAMUSCULAR; INTRAVENOUS EVERY 6 HOURS PRN
Status: DISCONTINUED | OUTPATIENT
Start: 2024-04-03 | End: 2024-04-04 | Stop reason: HOSPADM

## 2024-04-03 RX ORDER — CALCIUM CARBONATE 500 MG/1
2000 TABLET, CHEWABLE ORAL ONCE
Status: COMPLETED | OUTPATIENT
Start: 2024-04-03 | End: 2024-04-03

## 2024-04-03 RX ORDER — ROPINIROLE 0.5 MG/1
0.5 TABLET, FILM COATED ORAL DAILY
COMMUNITY

## 2024-04-03 RX ORDER — DIPHENHYDRAMINE HYDROCHLORIDE 50 MG/ML
12.5 INJECTION INTRAMUSCULAR; INTRAVENOUS
Status: DISCONTINUED | OUTPATIENT
Start: 2024-04-03 | End: 2024-04-03 | Stop reason: HOSPADM

## 2024-04-03 RX ORDER — OXYCODONE HCL 5 MG/5 ML
5 SOLUTION, ORAL ORAL
Status: COMPLETED | OUTPATIENT
Start: 2024-04-03 | End: 2024-04-03

## 2024-04-03 RX ORDER — TRANEXAMIC ACID 100 MG/ML
INJECTION, SOLUTION INTRAVENOUS PRN
Status: DISCONTINUED | OUTPATIENT
Start: 2024-04-03 | End: 2024-04-03 | Stop reason: SURG

## 2024-04-03 RX ORDER — SODIUM CHLORIDE, SODIUM LACTATE, POTASSIUM CHLORIDE, CALCIUM CHLORIDE 600; 310; 30; 20 MG/100ML; MG/100ML; MG/100ML; MG/100ML
INJECTION, SOLUTION INTRAVENOUS
Status: DISCONTINUED | OUTPATIENT
Start: 2024-04-03 | End: 2024-04-03 | Stop reason: SURG

## 2024-04-03 RX ORDER — MORPHINE SULFATE 4 MG/ML
1-3 INJECTION INTRAVENOUS
Status: DISCONTINUED | OUTPATIENT
Start: 2024-04-03 | End: 2024-04-04 | Stop reason: HOSPADM

## 2024-04-03 RX ORDER — SCOLOPAMINE TRANSDERMAL SYSTEM 1 MG/1
1 PATCH, EXTENDED RELEASE TRANSDERMAL
Status: DISCONTINUED | OUTPATIENT
Start: 2024-04-06 | End: 2024-04-04 | Stop reason: HOSPADM

## 2024-04-03 RX ORDER — HYDROCODONE BITARTRATE AND ACETAMINOPHEN 5; 325 MG/1; MG/1
1 TABLET ORAL EVERY 6 HOURS PRN
Status: DISCONTINUED | OUTPATIENT
Start: 2024-04-03 | End: 2024-04-04 | Stop reason: HOSPADM

## 2024-04-03 RX ORDER — LIDOCAINE HYDROCHLORIDE 40 MG/ML
SOLUTION TOPICAL PRN
Status: DISCONTINUED | OUTPATIENT
Start: 2024-04-03 | End: 2024-04-03 | Stop reason: SURG

## 2024-04-03 RX ORDER — EPHEDRINE SULFATE 50 MG/ML
5 INJECTION, SOLUTION INTRAVENOUS
Status: DISCONTINUED | OUTPATIENT
Start: 2024-04-03 | End: 2024-04-03 | Stop reason: HOSPADM

## 2024-04-03 RX ORDER — PROGESTERONE 100 MG/1
100 CAPSULE ORAL NIGHTLY
Status: DISCONTINUED | OUTPATIENT
Start: 2024-04-03 | End: 2024-04-04 | Stop reason: HOSPADM

## 2024-04-03 RX ORDER — OXYCODONE HCL 5 MG/5 ML
10 SOLUTION, ORAL ORAL
Status: COMPLETED | OUTPATIENT
Start: 2024-04-03 | End: 2024-04-03

## 2024-04-03 RX ORDER — SUCCINYLCHOLINE CHLORIDE 20 MG/ML
INJECTION INTRAMUSCULAR; INTRAVENOUS PRN
Status: DISCONTINUED | OUTPATIENT
Start: 2024-04-03 | End: 2024-04-03 | Stop reason: SURG

## 2024-04-03 RX ORDER — ALPRAZOLAM 0.25 MG/1
1 TABLET ORAL 3 TIMES DAILY PRN
Status: DISCONTINUED | OUTPATIENT
Start: 2024-04-03 | End: 2024-04-04 | Stop reason: HOSPADM

## 2024-04-03 RX ORDER — ONDANSETRON 2 MG/ML
INJECTION INTRAMUSCULAR; INTRAVENOUS PRN
Status: DISCONTINUED | OUTPATIENT
Start: 2024-04-03 | End: 2024-04-03 | Stop reason: SURG

## 2024-04-03 RX ORDER — ONDANSETRON 2 MG/ML
4 INJECTION INTRAMUSCULAR; INTRAVENOUS EVERY 4 HOURS PRN
Status: DISCONTINUED | OUTPATIENT
Start: 2024-04-03 | End: 2024-04-04 | Stop reason: HOSPADM

## 2024-04-03 RX ORDER — SODIUM CHLORIDE, SODIUM LACTATE, POTASSIUM CHLORIDE, CALCIUM CHLORIDE 600; 310; 30; 20 MG/100ML; MG/100ML; MG/100ML; MG/100ML
INJECTION, SOLUTION INTRAVENOUS CONTINUOUS
Status: ACTIVE | OUTPATIENT
Start: 2024-04-03 | End: 2024-04-03

## 2024-04-03 RX ORDER — MAGNESIUM SULFATE HEPTAHYDRATE 40 MG/ML
INJECTION, SOLUTION INTRAVENOUS PRN
Status: DISCONTINUED | OUTPATIENT
Start: 2024-04-03 | End: 2024-04-03 | Stop reason: SURG

## 2024-04-03 RX ORDER — MIDAZOLAM HYDROCHLORIDE 1 MG/ML
INJECTION INTRAMUSCULAR; INTRAVENOUS PRN
Status: DISCONTINUED | OUTPATIENT
Start: 2024-04-03 | End: 2024-04-03 | Stop reason: SURG

## 2024-04-03 RX ORDER — SCOLOPAMINE TRANSDERMAL SYSTEM 1 MG/1
1 PATCH, EXTENDED RELEASE TRANSDERMAL
Status: DISCONTINUED | OUTPATIENT
Start: 2024-04-03 | End: 2024-04-04 | Stop reason: HOSPADM

## 2024-04-03 RX ORDER — CYCLOBENZAPRINE HCL 5 MG
5 TABLET ORAL 3 TIMES DAILY PRN
Status: DISCONTINUED | OUTPATIENT
Start: 2024-04-03 | End: 2024-04-03

## 2024-04-03 RX ORDER — HYDRALAZINE HYDROCHLORIDE 20 MG/ML
5 INJECTION INTRAMUSCULAR; INTRAVENOUS
Status: DISCONTINUED | OUTPATIENT
Start: 2024-04-03 | End: 2024-04-03 | Stop reason: HOSPADM

## 2024-04-03 RX ORDER — OMEPRAZOLE 20 MG/1
20 CAPSULE, DELAYED RELEASE ORAL DAILY
Status: DISCONTINUED | OUTPATIENT
Start: 2024-04-04 | End: 2024-04-04 | Stop reason: HOSPADM

## 2024-04-03 RX ORDER — DEXAMETHASONE SODIUM PHOSPHATE 4 MG/ML
4 INJECTION, SOLUTION INTRA-ARTICULAR; INTRALESIONAL; INTRAMUSCULAR; INTRAVENOUS; SOFT TISSUE
Status: DISCONTINUED | OUTPATIENT
Start: 2024-04-03 | End: 2024-04-04 | Stop reason: HOSPADM

## 2024-04-03 RX ORDER — HALOPERIDOL 5 MG/ML
1 INJECTION INTRAMUSCULAR EVERY 6 HOURS PRN
Status: DISCONTINUED | OUTPATIENT
Start: 2024-04-03 | End: 2024-04-04 | Stop reason: HOSPADM

## 2024-04-03 RX ORDER — LABETALOL HYDROCHLORIDE 5 MG/ML
5 INJECTION, SOLUTION INTRAVENOUS
Status: DISCONTINUED | OUTPATIENT
Start: 2024-04-03 | End: 2024-04-03 | Stop reason: HOSPADM

## 2024-04-03 RX ORDER — ACETAMINOPHEN 500 MG
1000 TABLET ORAL ONCE
Status: COMPLETED | OUTPATIENT
Start: 2024-04-03 | End: 2024-04-03

## 2024-04-03 RX ORDER — DEXAMETHASONE SODIUM PHOSPHATE 4 MG/ML
INJECTION, SOLUTION INTRA-ARTICULAR; INTRALESIONAL; INTRAMUSCULAR; INTRAVENOUS; SOFT TISSUE PRN
Status: DISCONTINUED | OUTPATIENT
Start: 2024-04-03 | End: 2024-04-03 | Stop reason: SURG

## 2024-04-03 RX ORDER — ROPINIROLE 0.5 MG/1
0.5 TABLET, FILM COATED ORAL EVERY EVENING
Status: DISCONTINUED | OUTPATIENT
Start: 2024-04-03 | End: 2024-04-04 | Stop reason: HOSPADM

## 2024-04-03 RX ORDER — GABAPENTIN 300 MG/1
300 CAPSULE ORAL ONCE
Status: COMPLETED | OUTPATIENT
Start: 2024-04-03 | End: 2024-04-03

## 2024-04-03 RX ADMIN — FENTANYL CITRATE 50 MCG: 50 INJECTION, SOLUTION INTRAMUSCULAR; INTRAVENOUS at 13:52

## 2024-04-03 RX ADMIN — KETOROLAC TROMETHAMINE 15 MG: 15 INJECTION, SOLUTION INTRAMUSCULAR; INTRAVENOUS at 12:46

## 2024-04-03 RX ADMIN — PROPOFOL 75 MCG/KG/MIN: 10 INJECTION, EMULSION INTRAVENOUS at 11:31

## 2024-04-03 RX ADMIN — SCOPOLAMINE 1 PATCH: 1.5 PATCH, EXTENDED RELEASE TRANSDERMAL at 09:30

## 2024-04-03 RX ADMIN — GABAPENTIN 300 MG: 300 CAPSULE ORAL at 09:30

## 2024-04-03 RX ADMIN — MORPHINE SULFATE 1 MG: 4 INJECTION, SOLUTION INTRAMUSCULAR; INTRAVENOUS at 20:45

## 2024-04-03 RX ADMIN — TRANEXAMIC ACID 1000 MG: 100 INJECTION, SOLUTION INTRAVENOUS at 11:56

## 2024-04-03 RX ADMIN — FENTANYL CITRATE 50 MCG: 50 INJECTION, SOLUTION INTRAMUSCULAR; INTRAVENOUS at 14:20

## 2024-04-03 RX ADMIN — ANTACID TABLETS 2000 MG: 500 TABLET, CHEWABLE ORAL at 20:44

## 2024-04-03 RX ADMIN — MIDAZOLAM HYDROCHLORIDE 2 MG: 1 INJECTION, SOLUTION INTRAMUSCULAR; INTRAVENOUS at 11:23

## 2024-04-03 RX ADMIN — DEXAMETHASONE SODIUM PHOSPHATE 10 MG: 4 INJECTION INTRA-ARTICULAR; INTRALESIONAL; INTRAMUSCULAR; INTRAVENOUS; SOFT TISSUE at 11:29

## 2024-04-03 RX ADMIN — HYDROCODONE BITARTRATE AND ACETAMINOPHEN 1 TABLET: 5; 325 TABLET ORAL at 17:53

## 2024-04-03 RX ADMIN — FENTANYL CITRATE 50 MCG: 50 INJECTION, SOLUTION INTRAMUSCULAR; INTRAVENOUS at 13:12

## 2024-04-03 RX ADMIN — FENTANYL CITRATE 50 MCG: 50 INJECTION, SOLUTION INTRAMUSCULAR; INTRAVENOUS at 12:27

## 2024-04-03 RX ADMIN — PROGESTERONE 100 MG: 100 CAPSULE ORAL at 19:41

## 2024-04-03 RX ADMIN — ROPINIROLE HYDROCHLORIDE 0.5 MG: 0.5 TABLET, FILM COATED ORAL at 17:53

## 2024-04-03 RX ADMIN — SUCCINYLCHOLINE CHLORIDE 100 MG: 20 INJECTION, SOLUTION INTRAMUSCULAR; INTRAVENOUS at 11:27

## 2024-04-03 RX ADMIN — SODIUM CHLORIDE, POTASSIUM CHLORIDE, SODIUM LACTATE AND CALCIUM CHLORIDE: 600; 310; 30; 20 INJECTION, SOLUTION INTRAVENOUS at 11:23

## 2024-04-03 RX ADMIN — FENTANYL CITRATE 50 MCG: 50 INJECTION, SOLUTION INTRAMUSCULAR; INTRAVENOUS at 13:39

## 2024-04-03 RX ADMIN — OXYCODONE HYDROCHLORIDE 10 MG: 5 SOLUTION ORAL at 13:11

## 2024-04-03 RX ADMIN — LIDOCAINE HYDROCHLORIDE 100 MG: 20 INJECTION, SOLUTION EPIDURAL; INFILTRATION; INTRACAUDAL at 11:23

## 2024-04-03 RX ADMIN — PROPOFOL 150 MG: 10 INJECTION, EMULSION INTRAVENOUS at 11:27

## 2024-04-03 RX ADMIN — LIDOCAINE HYDROCHLORIDE 4 ML: 40 SOLUTION TOPICAL at 11:28

## 2024-04-03 RX ADMIN — CELECOXIB 400 MG: 200 CAPSULE ORAL at 09:29

## 2024-04-03 RX ADMIN — ONDANSETRON 8 MG: 2 INJECTION INTRAMUSCULAR; INTRAVENOUS at 12:46

## 2024-04-03 RX ADMIN — ACETAMINOPHEN 1000 MG: 500 TABLET, FILM COATED ORAL at 09:30

## 2024-04-03 RX ADMIN — SODIUM CHLORIDE, POTASSIUM CHLORIDE, SODIUM LACTATE AND CALCIUM CHLORIDE: 600; 310; 30; 20 INJECTION, SOLUTION INTRAVENOUS at 10:08

## 2024-04-03 RX ADMIN — MAGNESIUM SULFATE HEPTAHYDRATE 4 G: 2 INJECTION, SOLUTION INTRAVENOUS at 11:29

## 2024-04-03 ASSESSMENT — LIFESTYLE VARIABLES
TOTAL SCORE: 0
EVER HAD A DRINK FIRST THING IN THE MORNING TO STEADY YOUR NERVES TO GET RID OF A HANGOVER: NO
CONSUMPTION TOTAL: NEGATIVE
ALCOHOL_USE: NO
HAVE PEOPLE ANNOYED YOU BY CRITICIZING YOUR DRINKING: NO
AVERAGE NUMBER OF DAYS PER WEEK YOU HAVE A DRINK CONTAINING ALCOHOL: 0
DOES PATIENT WANT TO STOP DRINKING: NO
EVER FELT BAD OR GUILTY ABOUT YOUR DRINKING: NO
HAVE YOU EVER FELT YOU SHOULD CUT DOWN ON YOUR DRINKING: NO
TOTAL SCORE: 0
HOW MANY TIMES IN THE PAST YEAR HAVE YOU HAD 5 OR MORE DRINKS IN A DAY: 0
TOTAL SCORE: 0
ON A TYPICAL DAY WHEN YOU DRINK ALCOHOL HOW MANY DRINKS DO YOU HAVE: 0

## 2024-04-03 ASSESSMENT — PAIN DESCRIPTION - PAIN TYPE
TYPE: SURGICAL PAIN
TYPE: ACUTE PAIN;SURGICAL PAIN
TYPE: SURGICAL PAIN
TYPE: ACUTE PAIN

## 2024-04-03 ASSESSMENT — FIBROSIS 4 INDEX
FIB4 SCORE: 0.56
FIB4 SCORE: 0.56

## 2024-04-03 ASSESSMENT — PAIN SCALES - GENERAL: PAIN_LEVEL: 6

## 2024-04-03 ASSESSMENT — PATIENT HEALTH QUESTIONNAIRE - PHQ9
2. FEELING DOWN, DEPRESSED, IRRITABLE, OR HOPELESS: NOT AT ALL
SUM OF ALL RESPONSES TO PHQ9 QUESTIONS 1 AND 2: 0
1. LITTLE INTEREST OR PLEASURE IN DOING THINGS: NOT AT ALL

## 2024-04-03 NOTE — OP REPORT
Operative Report    Date: 4/3/2024    Surgeon: Leandra Heart M.D.    Assistant: Monica JONES    My assistant was medically necessary for this procedure. She placed the precordial electrodes of the NIMS and monitored the recurrent laryngeal nerves bilaterally throughout the procedure. She also by retracting tissues allowed me the surgical field visualization necessary for a safe surgery. Finally, she also assisted with hemostasis and wound closure.      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 Large deep neck and large thyroid made the operation very difficult. Both RLN identified and integrity documented.     Procedure in detail: The patient was identified in the pre-operative holding area and brought to the operating room. Correct side and site were identified.  GETA was smoothly induced. The patient was prepped and draped in the usual sterile fashion.    With the patient in the supine position, the head of the bed slightly elevated, the neck slightly extended, and the patient intubated with a NIM endotracheal tube, the precordial electrodes were placed by the surgical assistant, who then monitored the recurrent laryngeal nerves bilaterally throughout the procedure.     The neck was prepared with betadiene and draped in the usual fashion. The neck was entered through a lower transverse cervical incision and carried down through the platysma. Subplatysmal flaps were dissected superiorly and inferiorly down to the sternal notch. The midline cervical fascia was incised down to the capsule of the thyroid. The strap muscles were mobilized off the right thyroid lobe, and the superior pole vessels progressively divided with the Harmonic.  Patient accepted and is scheduled. Thank you!   The gland was massively large and hypervascular, and very stuck to surrounding structures and the majority of the blood loss during the procedure was from the right lobe. The thyroid veins were divided with the Harmonic. The recurrent laryngeal nerve and both parathyroids were identified and protected. Branches of the inferior thyroid artery were divided on the capsule of the gland with the Harmonic. Smaller vessels were either divided with the Harmonic or, when near to the recurrent nerve, divided between clamps and suture ligated with 3-0 Vicryl suture as the gland was dissected free from the nerve and off the trachea. The gland was transected at the medial border of the left thyroid lobe and the right thyroid lobe and the isthmus were sent for permanent pathologic exam.    There was a lateral thyroid mass, 1 cm, stuck to the strap muscle, this was excised and sent for permanent pathologic exam.    The strap muscles were then mobilized off of the left thyroid lobe, and the superior pole vessels progressively divided with the Harmonic. The middle and inferior thyroid veins were divided with the Harmonic. The recurrent laryngeal nerve and both parathyroids were identified and protected. Branches of the inferior thyroid artery were divided on the capsule of the gland with the Harminic. Smaller vessels were either divided with the Harmonic or, when close to the nerve, divided between clamps and suture ligated with 3-0 Vicryl as the gland was dissected free of the nerve and off of the trachea. The left  thyroid lobe was sent for permanent pathology. Good hemostasis was assured.     The integrity of both recurrent laryngeal nerves was documented. Hemoblast was placed in both sides of the neck. The midline cervical fascia was reapproximated with running 3-0 Vicryl. Platysma was reapproximated with interrupted 3-0 Vicryl. The skin was closed with monocryl.     The patient was awakened from general anesthetic, and was  taken to the recovery room in stable condition.    Sponge and needle counts were correct at the end of the case.     Specimen:   right thyroid lobe and isthmus  Right lateral thyroid mass  left thyroid lobe    EBL: 500 mL    Dispo: stable, extubated, to PACU    Leandra Heart M.D.

## 2024-04-03 NOTE — ANESTHESIA PREPROCEDURE EVALUATION
Case: [id number] Date/Time: 04/03/24 1145    Procedure: TOTAL THYROIDECTOMY    Pre-op diagnosis: NON-TOXIC MULTINODULAR GOITER    Location: [facility] ROOM 23 / SURGERY SAME DAY [facility]    Surgeons: Leandra Heart M.D.          47yo F here for total thyroidectomy    Relevant Problems   GI   (positive) Gastroesophageal reflux disease without esophagitis      ENDO   (positive) Acquired hypothyroidism      Other   (positive) Chronic midline low back pain without sciatica       Physical Exam    Airway   Mallampati: I  TM distance: <3 FB  Neck ROM: full       Cardiovascular - normal exam  Rhythm: regular  Rate: normal  (-) murmur     Dental - normal exam           Pulmonary - normal exam  Breath sounds clear to auscultation     Abdominal    Neurological - normal exam                   Anesthesia Plan    ASA 2       Plan - general       Airway plan will be ETT          Induction: intravenous    Postoperative Plan: Postoperative administration of opioids is intended.    Pertinent diagnostic labs and testing reviewed    Informed Consent:    Anesthetic plan and risks discussed with patient.    Use of blood products discussed with: patient whom consented to blood products.

## 2024-04-03 NOTE — OR NURSING
1301 patient arrival from OR; report received from MD and RN. Patient attached to monitor and VSS with patient on 10L O2 via mask with oral airway in place.     See flow sheets for vitals and interventions.    1318 report to CHRISTINA Wiate.

## 2024-04-03 NOTE — OR NURSING
Introduced self to patient/mother and discussed plan of care. Surgical consent signed. IV inserted without difficulty. Checked oxygen tank on gurney, 526 psi. Pre op complete.

## 2024-04-03 NOTE — ANESTHESIA TIME REPORT
Anesthesia Start and Stop Event Times       Date Time Event    4/3/2024 1116 Ready for Procedure     1123 Anesthesia Start     1306 Anesthesia Stop          Responsible Staff  04/03/24      Name Role Begin End    Bianka Cochran M.D. Anesth 1123 1306          Overtime Reason:  no overtime (within assigned shift)    Comments:

## 2024-04-03 NOTE — H&P
"Surgical History and Physical    Date: 4/3/2024    PCP: Myesha Stone M.D.  Attending Physician: Leandra Heart M.D. [facility]    CC: \"here for surgery\"    HPI: This is a 48 y.o. female who is presenting with multinodular goiter, here for surgery.     Past Medical History:   Diagnosis Date    Anxiety disorder 03/11/2024    xanax prn    Arthritis     BRCA2 negative     Bronchitis     Depression 03/11/2024    Diabetes (HCC) 03/11/2024    pre-diabetic    Heart burn 2000    Hypothyroid     Pain 03/11/2024    knees and back    Psychiatric problem 1994       Past Surgical History:   Procedure Laterality Date    ABDOMINAL HYSTERECTOMY TOTAL      KNEE ARTHROSCOPY Bilateral     LEEP      NO PERTINENT PAST SURGICAL HISTORY      Tubal and hysterectomy    TUBAL COAGULATION LAPAROSCOPIC BILATERAL         Current Facility-Administered Medications   Medication Dose Route Frequency Provider Last Rate Last Admin    scopolamine (Transderm-Scop) patch 1 Patch  1 Patch Transdermal Q72HRS Bianka Cochran M.D.   1 Patch at 04/03/24 0930    lidocaine (Xylocaine) 1 % injection 0.5 mL  0.5 mL Intradermal Once PRN Leandra Heart M.D.        lactated ringers infusion   Intravenous Continuous Leandra Heatr M.D. 10 mL/hr at 04/03/24 1008 New Bag at 04/03/24 1008       Social History     Socioeconomic History    Marital status: Single     Spouse name: Not on file    Number of children: Not on file    Years of education: Not on file    Highest education level: GED or equivalent   Occupational History    Not on file   Tobacco Use    Smoking status: Never     Passive exposure: Current    Smokeless tobacco: Never   Vaping Use    Vaping Use: Some days    Substances: Flavoring    Devices: Disposable    Passive vaping exposure: Yes   Substance and Sexual Activity    Alcohol use: Not Currently    Drug use: Not Currently     Types: Marijuana, Inhaled, Oral     Comment: stopped approximately 1 month ago    Sexual " activity: Not Currently     Partners: Male     Birth control/protection: Female Sterilization   Other Topics Concern    Not on file   Social History Narrative    Not on file     Social Determinants of Health     Financial Resource Strain: Medium Risk (6/16/2023)    Overall Financial Resource Strain (CARDIA)     Difficulty of Paying Living Expenses: Somewhat hard   Food Insecurity: Food Insecurity Present (6/16/2023)    Hunger Vital Sign     Worried About Running Out of Food in the Last Year: Never true     Ran Out of Food in the Last Year: Sometimes true   Transportation Needs: Patient Declined (6/16/2023)    PRAPARE - Transportation     Lack of Transportation (Medical): Patient declined     Lack of Transportation (Non-Medical): Patient declined   Physical Activity: Unknown (6/16/2023)    Exercise Vital Sign     Days of Exercise per Week: 1 day     Minutes of Exercise per Session: Patient declined   Stress: Stress Concern Present (6/16/2023)    [place] [employer] - Occupational Stress Questionnaire     Feeling of Stress : Very much   Social Connections: Unknown (6/16/2023)    Social Connection and Isolation Panel [NHANES]     Frequency of Communication with Friends and Family: Once a week     Frequency of Social Gatherings with Friends and Family: Never     Attends Scientologist Services: Patient declined     Active Member of Clubs or Organizations: No     Attends Club or Organization Meetings: Never     Marital Status: Never    Intimate Partner Violence: Not on file   Housing Stability: Unknown (6/16/2023)    Housing Stability Vital Sign     Unable to Pay for Housing in the Last Year: Patient refused     Number of Places Lived in the Last Year: Not on file     Unstable Housing in the Last Year: Patient refused       Family History   Problem Relation Age of Onset    Diabetes Mother     Diabetes Father     Diabetes Maternal Grandmother     Dementia Maternal Grandmother     Glaucoma Maternal  "Grandmother     Ovarian Cancer Neg Hx     Tubal Cancer Neg Hx     Peritoneal Cancer Neg Hx     Colorectal Cancer Neg Hx     Breast Cancer Neg Hx        Allergies:  Amoxicillin, Penicillins, and Latex    Review of Systems:  Constitutional: Negative for fever, chills, weight loss, malaise/fatigue and diaphoresis.   HENT: Negative for hearing loss, ear pain, nosebleeds, congestion, sore throat, neck pain, tinnitus and ear discharge.    Eyes: Negative for blurred vision, double vision, photophobia, pain, discharge and redness.   Respiratory: Negative for cough, hemoptysis, sputum production, shortness of breath, wheezing and stridor.    Cardiovascular: Negative for chest pain, palpitations, orthopnea, claudication, leg swelling and PND.   Gastrointestinal: Negative for heartburn, nausea, vomiting, abdominal pain, diarrhea, constipation, blood in stool and melena.   Genitourinary: Negative for dysuria, urgency, frequency, hematuria and flank pain.   Musculoskeletal: Negative for myalgias, back pain, joint pain and falls.   Skin: Negative for itching and rash.  Neurological: Negative for dizziness, tingling, tremors, sensory change, speech change, focal weakness, seizures, loss of consciousness, weakness and headaches.   Endo/Heme/Allergies: Negative for environmental allergies and polydipsia. Does not bruise/bleed easily.   Psychiatric/Behavioral: Negative for depression, suicidal ideas, hallucinations, memory loss and substance abuse. The patient is not nervous/anxious and does not have insomnia.    Physical Exam:  /76   Pulse (!) 58   Temp 36.6 °C (97.8 °F) (Temporal)   Resp 17   Ht 1.626 m (5' 4\")   Wt 101 kg (221 lb 9 oz)   SpO2 100%     Constitutional: she is oriented to person, place, and time.  she appears well-developed and well-nourished. No distress.   Head: Normocephalic and atraumatic.   Neck: Normal range of motion. Neck supple. No JVD present. No tracheal deviation present. No thyromegaly " present.   Cardiovascular: Normal rate, regular rhythm, normal heart sounds and intact distal pulses.  Exam reveals no gallop and no friction rub.  No murmur heard.  Pulmonary/Chest: Effort normal and breath sounds normal. No stridor. No respiratory distress. she has no wheezes. she has no rales.   Abdominal: Soft. Bowel sounds are normal. she exhibits no distension and no mass. There is no tenderness. There is no rebound and no guarding.   Musculoskeletal: Normal range of motion. she exhibits no edema and no tenderness.   Neurological: she is alert and oriented to person, place, and time. she has normal reflexes. No cranial nerve deficit. Coordination normal.   Skin: Skin is warm and dry. No rash noted. she is not diaphoretic. No erythema. No pallor.   Psychiatric: she has a normal mood and affect.  Behavior is normal.       Assessment: This is a 48 y.o. female here for surgery.     Plan: Total thyroidectomy.     Leandra Heart M.D.  [facility]  [phone]

## 2024-04-03 NOTE — PROGRESS NOTES
Pt transferred to floor and oriented to room. Ambulated to bed and passed dysphagia screen. Pt oriented to room and denies any needs at this time.   Split-Thickness Skin Graft Text: The defect edges were debeveled with a #15 scalpel blade.  Given the location of the defect, shape of the defect and the proximity to free margins a split thickness skin graft was deemed most appropriate.  Using a sterile surgical marker, the primary defect shape was transferred to the donor site. The split thickness graft was then harvested.  The skin graft was then placed in the primary defect and oriented appropriately.

## 2024-04-03 NOTE — OR NURSING
1318 - Report from Nargis LARKIN. ERAS initiated at 1310.    1339 - Pt medicated for pain per MAR.    1342 - Pt mother updated by telephone.    1352 - Pt medicated for pain per MAR.    1420 - Pt medicated for pain per MAR.    1428 - Pt reports pain as tolerable.    1445 - Report given to Jil Larkin CDU. All questions answered, verbalizes understanding. Patient transport request placed.     1455 - Pt mother updated via telephone.    1506 -  Pt transported back to room via gurney with 10L oxygen and all personal belongings. Accompanied by transport.

## 2024-04-03 NOTE — ANESTHESIA PROCEDURE NOTES
Airway    Date/Time: 4/3/2024 11:28 AM    Performed by: Bianka Cochran M.D.  Authorized by: Bianka Cochran M.D.    Location:  OR  Urgency:  Elective  Indications for Airway Management:  Anesthesia      Spontaneous Ventilation: absent    Sedation Level:  Deep  Preoxygenated: Yes    Patient Position:  Sniffing  Mask Difficulty Assessment:  0 - not attempted  Final Airway Type:  Endotracheal airway  Final Endotracheal Airway:  ETT and NIM tube  Cuffed: Yes    Technique Used for Successful ETT Placement:  Direct laryngoscopy    Insertion Site:  Oral  Blade Type:  Christian  Laryngoscope Blade/Videolaryngoscope Blade Size:  3  ETT Size (mm):  6.0  Measured from:  Teeth  ETT to Teeth (cm):  21  Placement Verified by: auscultation and capnometry    Cormack-Lehane Classification:  Grade I - full view of glottis  Number of Attempts at Approach:  1

## 2024-04-03 NOTE — PROGRESS NOTES
2 RN Skin Assessment Completed by haseeb RN and carmen RN.     Head: WDL  Ears: WDL  Nose: WDL  Mouth: WDL  Neck: surgical incision  Breasts/Chest: WDL  Shoulder Blades: WDL  Spine: WDL  (R) Arm/Elbow/hand: WDL  (L) Arm/Elbow/hand: WDL  Abdomen:WDL  Groin: WDL  Sacrum/Coccyx/Buttocks: blanching  (R) Leg: WDL  (L) Leg: WDL  (R) Heel/Foot/Toe: blanching  (L) Heel/Foot/Toe: blanching              Devices in place: BP Cuff and Pulse Ox     Interventions in place: Oxymask and Pillows     Possible skin injury found: No     Pictures uploaded into Epic: N/A  Wound Consult Placed: N/A

## 2024-04-03 NOTE — ANESTHESIA POSTPROCEDURE EVALUATION
Patient: Ely Rodas    Procedure Summary       Date: 04/03/24 Room / Location: [facility] ROOM 23 / SURGERY SAME DAY [facility]    Anesthesia Start: 1123 Anesthesia Stop: 1306    Procedure: TOTAL THYROIDECTOMY (Neck) Diagnosis: (NON-TOXIC MULTINODULAR GOITER; BILATERAL AFIRMA SUSPICIOUS)    Surgeons: Leandra Heart M.D. Responsible Provider: Bianka Cochran M.D.    Anesthesia Type: general ASA Status: 2            Final Anesthesia Type: general  Last vitals  BP   Blood Pressure: (!) 146/67    Temp   36.5 °C (97.7 °F)    Pulse   (!) 54   Resp   13    SpO2   100 %      Anesthesia Post Evaluation    Patient location during evaluation: PACU  Patient participation: complete - patient participated  Level of consciousness: awake and alert  Pain score: 6    Airway patency: patent  Anesthetic complications: no  Cardiovascular status: hemodynamically stable  Respiratory status: acceptable  Hydration status: euvolemic    PONV: none          No notable events documented.

## 2024-04-03 NOTE — PROGRESS NOTES
Discharge instructions after thyroidectomy:    You had surgery called thyroidectomy. This means that part or all of your thyroid gland was removed. The main job of the thyroid gland is to make thyroid hormone. This hormone controls your body’s metabolism. This is the way your body creates and uses energy. Removing the thyroid gland removes your body’s source of thyroid hormone. So after the surgery, you will need to take thyroid hormone pills daily. This helps keep the level of thyroid hormone in your body steady. This sheet tells you more about how to care for yourself after surgery.    Recovering After Surgery:    Get plenty of rest.    Incisional care: you have a waterproof dressing in place, OK to shower but do not submerge the incision or dressing. You may remove your dressing after 48 hours. OK to shower after the waterproof dressing is removed. No other specific care is required for your incision.Do not submerge the incision in the bath or other water for 2 weeks after surgery.    Avoid heavy lifting and strenuous activity until your follow up appointment.    Walk a few times daily. But don’t push yourself too hard. Slowly increase your pace and distance, as you feel able.    Return to work when your doctor says it’s okay.    Taking Your Thyroid Medication:    Take your medication as directed.    Use a pillbox labeled with the days of the week. This will help you remember whether you’ve taken your medication each day.    Take your medication with a liquid. But avoid taking it with soy milk. Soy milk can affect how well your body absorbs the medication. The pill needs to reach your stomach and not dissolve in your throat.    Try to take your medication with the same types and amounts of food and liquid each day. This helps control the amount of thyroid hormone in your system.    After taking your medication:    Wait 4 hours before eating or drinking anything that contains soy.    Wait 4 hours before taking  certain medications. These include:     Iron supplements     Calcium supplements     Antacids that contain either calcium or aluminum hydroxide     Medications that lower your cholesterol    Do not stop taking your medication even if you become pregnant.    Never stop taking medications on your own.    Laboratory Draw:  You have been provided with a prescription to have you calcium levels drawn prior to your follow up visit. The order is at the laboratory that you designated at as your lab of choice at the office. Please have this drawn ~2 days prior to your follow-up visit.    Keeping Your Doctor’s Appointments:    See your doctor for regular visits. These are needed to monitor your health.    Have routine blood tests done. These check the level of thyroid hormone in your body. This helps your doctor know whether to adjust the dosage of your medication if needed.    Tell your doctor about any signs of further thyroid problems.    Signs that you may have too much thyroid hormone in your body include:     Restlessness     Rapid weight loss     Sweating     Signs that you may have too little thyroid hormone in your body include:     Fatigue or sluggishness     Puffy hands, face, or feet     Hoarseness     Muscle pain     Slow pulse (less than 60 beats per minute)      When to Call Your Doctor:  Call your doctor right away if you have any of the following:  Fever above 100.4°F  Swelling or bleeding at the incision site  Choking  Trouble breathing  A sore throat that lasts longer than 7 days  Tingling or cramps in your hands, feet, or lips    FOLLOW-UP:  Please call my office at [phone] to make an appointment in 1 week    Office address:  [address] [place] [address] [address]  [place] [address]    Leandra Heart M.D.  [facility]  [phone]

## 2024-04-04 ENCOUNTER — PHARMACY VISIT (OUTPATIENT)
Dept: PHARMACY | Facility: MEDICAL CENTER | Age: 49
End: 2024-04-04
Payer: COMMERCIAL

## 2024-04-04 VITALS
HEIGHT: 64 IN | RESPIRATION RATE: 16 BRPM | HEART RATE: 56 BPM | WEIGHT: 223.55 LBS | OXYGEN SATURATION: 95 % | BODY MASS INDEX: 38.16 KG/M2 | DIASTOLIC BLOOD PRESSURE: 60 MMHG | SYSTOLIC BLOOD PRESSURE: 121 MMHG | TEMPERATURE: 97.3 F

## 2024-04-04 PROBLEM — E04.2 NONTOXIC MULTINODULAR GOITER: Status: RESOLVED | Noted: 2024-04-03 | Resolved: 2024-04-04

## 2024-04-04 LAB
ALBUMIN SERPL BCP-MCNC: 3.8 G/DL (ref 3.2–4.9)
ALBUMIN SERPL BCP-MCNC: 3.8 G/DL (ref 3.2–4.9)
CALCIUM SERPL-MCNC: 8.7 MG/DL (ref 8.5–10.5)
CALCIUM SERPL-MCNC: 8.8 MG/DL (ref 8.5–10.5)

## 2024-04-04 PROCEDURE — A9270 NON-COVERED ITEM OR SERVICE: HCPCS | Performed by: SURGERY

## 2024-04-04 PROCEDURE — 700102 HCHG RX REV CODE 250 W/ 637 OVERRIDE(OP): Performed by: SURGERY

## 2024-04-04 PROCEDURE — G0378 HOSPITAL OBSERVATION PER HR: HCPCS

## 2024-04-04 PROCEDURE — RXMED WILLOW AMBULATORY MEDICATION CHARGE: Performed by: SURGERY

## 2024-04-04 PROCEDURE — 82040 ASSAY OF SERUM ALBUMIN: CPT

## 2024-04-04 PROCEDURE — 82310 ASSAY OF CALCIUM: CPT

## 2024-04-04 PROCEDURE — 700111 HCHG RX REV CODE 636 W/ 250 OVERRIDE (IP): Mod: JZ | Performed by: SURGERY

## 2024-04-04 PROCEDURE — 96376 TX/PRO/DX INJ SAME DRUG ADON: CPT

## 2024-04-04 RX ORDER — HYDROCODONE BITARTRATE AND ACETAMINOPHEN 5; 325 MG/1; MG/1
1 TABLET ORAL EVERY 6 HOURS PRN
Qty: 30 TABLET | Refills: 0 | Status: SHIPPED | OUTPATIENT
Start: 2024-04-04 | End: 2024-04-11

## 2024-04-04 RX ORDER — CALCIUM CARBONATE 1000 MG/1
2000 TABLET, CHEWABLE ORAL
Qty: 90 TABLET | Refills: 3 | Status: SHIPPED | OUTPATIENT
Start: 2024-04-04

## 2024-04-04 RX ORDER — OXYCODONE HYDROCHLORIDE 5 MG/1
5 TABLET ORAL EVERY 6 HOURS PRN
Qty: 30 TABLET | Refills: 0 | Status: SHIPPED | OUTPATIENT
Start: 2024-04-04 | End: 2024-04-12

## 2024-04-04 RX ORDER — LEVOTHYROXINE SODIUM 0.12 MG/1
125 TABLET ORAL
Qty: 30 TABLET | Refills: 2 | OUTPATIENT
Start: 2024-04-04

## 2024-04-04 RX ORDER — IBUPROFEN 800 MG/1
400 TABLET ORAL EVERY 6 HOURS PRN
Status: DISCONTINUED | OUTPATIENT
Start: 2024-04-04 | End: 2024-04-04

## 2024-04-04 RX ADMIN — MORPHINE SULFATE 1 MG: 4 INJECTION, SOLUTION INTRAMUSCULAR; INTRAVENOUS at 00:13

## 2024-04-04 RX ADMIN — OMEPRAZOLE 20 MG: 20 CAPSULE, DELAYED RELEASE ORAL at 05:08

## 2024-04-04 RX ADMIN — LEVOTHYROXINE SODIUM 125 MCG: 0.12 TABLET ORAL at 05:08

## 2024-04-04 RX ADMIN — ANTACID TABLETS 2000 MG: 500 TABLET, CHEWABLE ORAL at 05:08

## 2024-04-04 RX ADMIN — HYDROCODONE BITARTRATE AND ACETAMINOPHEN 1 TABLET: 5; 325 TABLET ORAL at 04:02

## 2024-04-04 ASSESSMENT — PAIN DESCRIPTION - PAIN TYPE: TYPE: ACUTE PAIN;SURGICAL PAIN

## 2024-04-04 NOTE — CARE PLAN
Problem: Pain - Standard  Goal: Alleviation of pain or a reduction in pain to the patient’s comfort goal  Outcome: Progressing  Note: Prn meds per MAR     Problem: Knowledge Deficit - Standard  Goal: Patient and family/care givers will demonstrate understanding of plan of care, disease process/condition, diagnostic tests and medications  Outcome: Progressing  Note: Dc plans   The patient is Stable - Low risk of patient condition declining or worsening    Shift Goals  Clinical Goals: Pain control, post-op monitoring  Patient Goals: Pain control, discharge  Family Goals: Plan of care    Progress made toward(s) clinical / shift goals:  pain control, dc plans    Patient is not progressing towards the following goals:

## 2024-04-04 NOTE — PROGRESS NOTES
Received in bed sleeping, aaox4, c/o throat surgical pain, norco not effective and causes itchiness as verbalized, will advise MD, neck dressing CDI, needs attended.

## 2024-04-04 NOTE — CARE PLAN
The patient is Stable - Low risk of patient condition declining or worsening    Shift Goals  Clinical Goals: Pain control, post-op monitoring  Patient Goals: Pain control, discharge  Family Goals: Plan of care      Problem: Pain - Standard  Goal: Alleviation of pain or a reduction in pain to the patient’s comfort goal  Description: Target End Date:  Prior to discharge or change in level of care    Document on Vitals flowsheet    1.  Document pain using the appropriate pain scale per order or unit policy  2.  Educate and implement non-pharmacologic comfort measures (i.e. relaxation, distraction, massage, cold/heat therapy, etc.)  3.  Pain management medications as ordered  4.  Reassess pain after pain med administration per policy  5.  If opiods administered assess patient's response to pain medication is appropriate per POSS sedation scale  6.  Follow pain management plan developed in collaboration with patient and interdisciplinary team (including palliative care or pain specialists if applicable)  Outcome: Progressing     Problem: Knowledge Deficit - Standard  Goal: Patient and family/care givers will demonstrate understanding of plan of care, disease process/condition, diagnostic tests and medications  Description: Target End Date:  1-3 days or as soon as patient condition allows    Document in Patient Education    1.  Patient and family/caregiver oriented to unit, equipment, visitation policy and means for communicating concern  2.  Complete/review Learning Assessment  3.  Assess knowledge level of disease process/condition, treatment plan, diagnostic tests and medications  4.  Explain disease process/condition, treatment plan, diagnostic tests and medications  Outcome: Progressing

## 2024-04-04 NOTE — DISCHARGE SUMMARY
Discharge Summary      DATE OF ADMISSION: 4/3/2024    DATE OF DISCHARGE: 4/4/2024    DISCHARGE DIAGNOSIS:  Multinodular goiter, AFIRMA suspicious    DISCHARGE CONDITION:  good    CONSULTATIONS:  none    PROCEDURES:  4/3/2024: total thyroidectomy    BRIEF HPI and HOSPITAL COURSE:  Admitted with above, see admission history and physical. Underwent procedure as above. Kept overnight for pain control. On day of discharge, the patient has stable vital signs, tolerating a regular diet, and pain is well controlled with PO meds. The patient is stable for discharge to home.    DISCHARGE MEDICATIONS     Medication List        START taking these medications        Instructions   HYDROcodone-acetaminophen 5-325 MG Tabs per tablet  Commonly known as: Norco   Take 1 Tablet by mouth every 6 hours as needed (pain) for up to 7 days.  Dose: 1 Tablet     Tums Ultra 1000 1000 MG Chew  Generic drug: Calcium Carbonate Antacid   Chew 2 Tablets 3 times a day with meals.  Dose: 2,000 mg            CONTINUE taking these medications        Instructions   albuterol 108 (90 Base) MCG/ACT Aers inhalation aerosol   Inhale 2 Puffs every 6 hours as needed for Shortness of Breath.  Dose: 2 Puff     ALPRAZolam 1 MG Tabs  Commonly known as: Xanax   Doctor's comments: Intentional increase to TID from BID  Take 1 Tablet by mouth 3 times a day as needed for Anxiety for up to 90 days. Indications: Feeling Anxious  Dose: 1 mg     cyclobenzaprine 5 mg tablet  Commonly known as: Flexeril   Take 1 Tablet by mouth 3 times a day as needed for Muscle Spasms or Moderate Pain.  Dose: 5 mg     * Estradiol 0.025 MG/24HR Pttw   Apply 1 patch to skin Transdermal every 3 days 30 day(s)     * Estradiol 0.025 MG/24HR Pttw   Apply 1 patch to skin Transdermal every 3 days     lansoprazole 30 MG Cpdr  Commonly known as: Prevacid   Take 1 capsule before a meal     progesterone 100 MG Caps  Commonly known as: Prometrium   Take 1 Capsule by mouth every day at bedtime  (Take 1  Capsule by mouth every day at bedtime)  Dose: 100 mg     ROPINIRole 0.5 MG Tabs  Commonly known as: Requip   Take 0.5 mg by mouth every day.  Dose: 0.5 mg     vitamin D2 (Ergocalciferol) 1.25 MG (90032 UT) Caps capsule  Commonly known as: Drisdol   Take 1 capsule by mouth every 7 days           * This list has 2 medication(s) that are the same as other medications prescribed for you. Read the directions carefully, and ask your doctor or other care provider to review them with you.                STOP taking these medications      levothyroxine 88 MCG Tabs  Commonly known as: Synthroid                  Discharge instructions after thyroidectomy:    You had surgery called thyroidectomy. This means that part or all of your thyroid gland was removed. The main job of the thyroid gland is to make thyroid hormone. This hormone controls your body’s metabolism. This is the way your body creates and uses energy. Removing the thyroid gland removes your body’s source of thyroid hormone. So after the surgery, you will need to take thyroid hormone pills daily. This helps keep the level of thyroid hormone in your body steady. This sheet tells you more about how to care for yourself after surgery.    Recovering After Surgery:    Get plenty of rest.    Incisional care: you have a waterproof dressing in place, OK to shower but do not submerge the incision or dressing. You may remove your dressing after 48 hours. OK to shower after the waterproof dressing is removed. No other specific care is required for your incision.Do not submerge the incision in the bath or other water for 2 weeks after surgery.    Avoid heavy lifting and strenuous activity until your follow up appointment.    Walk a few times daily. But don’t push yourself too hard. Slowly increase your pace and distance, as you feel able.    Return to work when your doctor says it’s okay.    Taking Your Thyroid Medication:    Take your medication as directed.    Use a pillbox  labeled with the days of the week. This will help you remember whether you’ve taken your medication each day.    Take your medication with a liquid. But avoid taking it with soy milk. Soy milk can affect how well your body absorbs the medication. The pill needs to reach your stomach and not dissolve in your throat.    Try to take your medication with the same types and amounts of food and liquid each day. This helps control the amount of thyroid hormone in your system.    After taking your medication:    Wait 4 hours before eating or drinking anything that contains soy.    Wait 4 hours before taking certain medications. These include:     Iron supplements     Calcium supplements     Antacids that contain either calcium or aluminum hydroxide     Medications that lower your cholesterol    Do not stop taking your medication even if you become pregnant.    Never stop taking medications on your own.    Take your TUMS as directed. Signs of low blood calcium can be tingling in your hands or feet or around your mouth. If you experience these symptoms, take more TUMS. If the symptoms persist despite this, please call the physician.    Laboratory Draw:  You have been provided with a prescription to have you calcium levels drawn prior to your follow up visit. The order is at the laboratory that you designated at as your lab of choice at the office. Please have this drawn ~2 days prior to your follow-up visit.    Keeping Your Doctor’s Appointments:    See your doctor for regular visits. These are needed to monitor your health.    Have routine blood tests done. These check the level of thyroid hormone in your body. This helps your doctor know whether to adjust the dosage of your medication if needed.    Tell your doctor about any signs of further thyroid problems.    Signs that you may have too much thyroid hormone in your body include:     Restlessness     Rapid weight loss     Sweating     Signs that you may have too little  thyroid hormone in your body include:     Fatigue or sluggishness     Puffy hands, face, or feet     Hoarseness     Muscle pain     Slow pulse (less than 60 beats per minute)      When to Call Your Doctor:  Call your doctor right away if you have any of the following:  Fever above 100.4°F  Swelling or bleeding at the incision site  Choking  Trouble breathing  A sore throat that lasts longer than 7 days  Tingling or cramps in your hands, feet, or lips    FOLLOW-UP:  Please call my office at [phone] to make an appointment in 1 week    Office address:  [address] [place] [address] [address]  [place] [address]    Leandra Heart M.D.  [facility]  [phone]

## 2024-04-04 NOTE — CARE PLAN
The patient is Stable - Low risk of patient condition declining or worsening    Shift Goals  Clinical Goals: Post- op vitals; pain control  Patient Goals: Rest  Family Goals: JEREMY    Progress made toward(s) clinical / shift goals:    Problem: Pain - Standard  Goal: Alleviation of pain or a reduction in pain to the patient’s comfort goal  Outcome: Progressing     Problem: Knowledge Deficit - Standard  Goal: Patient and family/care givers will demonstrate understanding of plan of care, disease process/condition, diagnostic tests and medications  Outcome: Progressing

## 2024-04-04 NOTE — PROGRESS NOTES
"Surgical Progress Note:    POD# 1  S/P TT     Feeling much better this AM. Pain better controlled.     PE:  /60   Pulse (!) 56   Temp 36.3 °C (97.3 °F) (Temporal)   Resp 16   Ht 1.626 m (5' 4\")   Wt 101 kg (223 lb 8.7 oz)   SpO2 95%   BMI 38.37 kg/m²     I/O:   Intake/Output Summary (Last 24 hours) at 4/4/2024 0817  Last data filed at 4/4/2024 0514  Gross per 24 hour   Intake 1935 ml   Output 500 ml   Net 1435 ml       NAD  Breathing comfortably  Voice strong  Incision c/d/i with sutures, no hematoma    Labs:  Calcium:   6p: 8.2   12a: 8.7   6a:8.8      A/P: POD# 1  S/P TT   - doing well  - d/c home      Leandra Heart M.D.  [facility]  [phone]          "

## 2024-04-04 NOTE — DISCHARGE INSTRUCTIONS
Discharge instructions after thyroidectomy:     You had surgery called thyroidectomy. This means that part or all of your thyroid gland was removed. The main job of the thyroid gland is to make thyroid hormone. This hormone controls your body’s metabolism. This is the way your body creates and uses energy. Removing the thyroid gland removes your body’s source of thyroid hormone. So after the surgery, you will need to take thyroid hormone pills daily. This helps keep the level of thyroid hormone in your body steady. This sheet tells you more about how to care for yourself after surgery.     Recovering After Surgery:     Get plenty of rest.     Incisional care: you have a waterproof dressing in place, OK to shower but do not submerge the incision or dressing. You may remove your dressing after 48 hours. OK to shower after the waterproof dressing is removed. No other specific care is required for your incision.Do not submerge the incision in the bath or other water for 2 weeks after surgery.     Avoid heavy lifting and strenuous activity until your follow up appointment.     Walk a few times daily. But don’t push yourself too hard. Slowly increase your pace and distance, as you feel able.     Return to work when your doctor says it’s okay.     Taking Your Thyroid Medication:     Take your medication as directed.     Use a pillbox labeled with the days of the week. This will help you remember whether you’ve taken your medication each day.     Take your medication with a liquid. But avoid taking it with soy milk. Soy milk can affect how well your body absorbs the medication. The pill needs to reach your stomach and not dissolve in your throat.     Try to take your medication with the same types and amounts of food and liquid each day. This helps control the amount of thyroid hormone in your system.     After taking your medication:     Wait 4 hours before eating or drinking anything that contains soy.     Wait 4 hours  before taking certain medications. These include:                 Iron supplements                 Calcium supplements                 Antacids that contain either calcium or aluminum hydroxide                 Medications that lower your cholesterol     Do not stop taking your medication even if you become pregnant.     Never stop taking medications on your own.     Take your TUMS as directed. Signs of low blood calcium can be tingling in your hands or feet or around your mouth. If you experience these symptoms, take more TUMS. If the symptoms persist despite this, please call the physician.     Laboratory Draw:  You have been provided with a prescription to have you calcium levels drawn prior to your follow up visit. The order is at the laboratory that you designated at as your lab of choice at the office. Please have this drawn ~2 days prior to your follow-up visit.     Keeping Your Doctor’s Appointments:     See your doctor for regular visits. These are needed to monitor your health.     Have routine blood tests done. These check the level of thyroid hormone in your body. This helps your doctor know whether to adjust the dosage of your medication if needed.     Tell your doctor about any signs of further thyroid problems.     Signs that you may have too much thyroid hormone in your body include:                 Restlessness                 Rapid weight loss                 Sweating                 Signs that you may have too little thyroid hormone in your body include:                 Fatigue or sluggishness                 Puffy hands, face, or feet                 Hoarseness                 Muscle pain                 Slow pulse (less than 60 beats per minute)        When to Call Your Doctor:  Call your doctor right away if you have any of the following:  Fever above 100.4°F  Swelling or bleeding at the incision site  Choking  Trouble breathing  A sore throat that lasts longer than 7 days  Tingling or cramps in  your hands, feet, or lips     FOLLOW-UP:  Please call my office at [phone] to make an appointment in 1 week     Office address:  [address] [facility] [address]  [place] [address]     Leandra Heart M.D.  [facility]  [phone]

## 2024-04-06 ENCOUNTER — HOSPITAL ENCOUNTER (EMERGENCY)
Facility: MEDICAL CENTER | Age: 49
End: 2024-04-06
Attending: EMERGENCY MEDICINE
Payer: COMMERCIAL

## 2024-04-06 ENCOUNTER — PHARMACY VISIT (OUTPATIENT)
Dept: PHARMACY | Facility: MEDICAL CENTER | Age: 49
End: 2024-04-06
Payer: COMMERCIAL

## 2024-04-06 VITALS
RESPIRATION RATE: 16 BRPM | DIASTOLIC BLOOD PRESSURE: 64 MMHG | BODY MASS INDEX: 38.09 KG/M2 | WEIGHT: 223.11 LBS | HEART RATE: 56 BPM | OXYGEN SATURATION: 98 % | TEMPERATURE: 98.3 F | SYSTOLIC BLOOD PRESSURE: 140 MMHG | HEIGHT: 64 IN

## 2024-04-06 DIAGNOSIS — T88.8XXA FLUID COLLECTION AT SURGICAL SITE, INITIAL ENCOUNTER: ICD-10-CM

## 2024-04-06 LAB
ALBUMIN SERPL BCP-MCNC: 3.7 G/DL (ref 3.2–4.9)
ALBUMIN/GLOB SERPL: 1.2 G/DL
ALP SERPL-CCNC: 101 U/L (ref 30–99)
ALT SERPL-CCNC: 38 U/L (ref 2–50)
ANION GAP SERPL CALC-SCNC: 14 MMOL/L (ref 7–16)
AST SERPL-CCNC: 37 U/L (ref 12–45)
BASOPHILS # BLD AUTO: 0.3 % (ref 0–1.8)
BASOPHILS # BLD: 0.03 K/UL (ref 0–0.12)
BILIRUB SERPL-MCNC: 0.4 MG/DL (ref 0.1–1.5)
BUN SERPL-MCNC: 8 MG/DL (ref 8–22)
CALCIUM ALBUM COR SERPL-MCNC: 9.2 MG/DL (ref 8.5–10.5)
CALCIUM SERPL-MCNC: 9 MG/DL (ref 8.5–10.5)
CHLORIDE SERPL-SCNC: 102 MMOL/L (ref 96–112)
CO2 SERPL-SCNC: 25 MMOL/L (ref 20–33)
CREAT SERPL-MCNC: 0.63 MG/DL (ref 0.5–1.4)
EOSINOPHIL # BLD AUTO: 0.2 K/UL (ref 0–0.51)
EOSINOPHIL NFR BLD: 2.3 % (ref 0–6.9)
ERYTHROCYTE [DISTWIDTH] IN BLOOD BY AUTOMATED COUNT: 39.3 FL (ref 35.9–50)
GFR SERPLBLD CREATININE-BSD FMLA CKD-EPI: 109 ML/MIN/1.73 M 2
GLOBULIN SER CALC-MCNC: 3.1 G/DL (ref 1.9–3.5)
GLUCOSE SERPL-MCNC: 82 MG/DL (ref 65–99)
HCT VFR BLD AUTO: 31.2 % (ref 37–47)
HGB BLD-MCNC: 10.5 G/DL (ref 12–16)
IMM GRANULOCYTES # BLD AUTO: 0.02 K/UL (ref 0–0.11)
IMM GRANULOCYTES NFR BLD AUTO: 0.2 % (ref 0–0.9)
LYMPHOCYTES # BLD AUTO: 3.79 K/UL (ref 1–4.8)
LYMPHOCYTES NFR BLD: 42.7 % (ref 22–41)
MCH RBC QN AUTO: 27.3 PG (ref 27–33)
MCHC RBC AUTO-ENTMCNC: 33.7 G/DL (ref 32.2–35.5)
MCV RBC AUTO: 81 FL (ref 81.4–97.8)
MONOCYTES # BLD AUTO: 0.52 K/UL (ref 0–0.85)
MONOCYTES NFR BLD AUTO: 5.9 % (ref 0–13.4)
NEUTROPHILS # BLD AUTO: 4.31 K/UL (ref 1.82–7.42)
NEUTROPHILS NFR BLD: 48.6 % (ref 44–72)
NRBC # BLD AUTO: 0 K/UL
NRBC BLD-RTO: 0 /100 WBC (ref 0–0.2)
PLATELET # BLD AUTO: 238 K/UL (ref 164–446)
PMV BLD AUTO: 10.7 FL (ref 9–12.9)
POTASSIUM SERPL-SCNC: 3.9 MMOL/L (ref 3.6–5.5)
PROT SERPL-MCNC: 6.8 G/DL (ref 6–8.2)
RBC # BLD AUTO: 3.85 M/UL (ref 4.2–5.4)
SODIUM SERPL-SCNC: 141 MMOL/L (ref 135–145)
WBC # BLD AUTO: 8.9 K/UL (ref 4.8–10.8)

## 2024-04-06 PROCEDURE — 99284 EMERGENCY DEPT VISIT MOD MDM: CPT

## 2024-04-06 PROCEDURE — 36415 COLL VENOUS BLD VENIPUNCTURE: CPT

## 2024-04-06 PROCEDURE — 85025 COMPLETE CBC W/AUTO DIFF WBC: CPT

## 2024-04-06 PROCEDURE — 80053 COMPREHEN METABOLIC PANEL: CPT

## 2024-04-06 PROCEDURE — RXMED WILLOW AMBULATORY MEDICATION CHARGE: Performed by: EMERGENCY MEDICINE

## 2024-04-06 RX ORDER — CEPHALEXIN 500 MG/1
500 CAPSULE ORAL 4 TIMES DAILY
Qty: 28 CAPSULE | Refills: 0 | Status: ACTIVE | OUTPATIENT
Start: 2024-04-06 | End: 2024-04-13

## 2024-04-06 ASSESSMENT — FIBROSIS 4 INDEX: FIB4 SCORE: 0.56

## 2024-04-06 NOTE — ED NOTES
Patient Identifiers verified; patient ambulated to room with a steady gait; accompanied by family; changed into gown; charted the Patient for an Emergency Room Physician to see.

## 2024-04-06 NOTE — ED PROVIDER NOTES
ER Provider Note    Scribed for Neftali Villanueva M.D. by Bran Valdez. 4/6/2024   11:53 AM    Primary Care Provider: Myesha Stone M.D.    CHIEF COMPLAINT  Chief Complaint   Patient presents with    Post-Op Complications     EXTERNAL RECORDS REVIEWED  Inpatient Notes Patient had thyroidectomy surgery by Dr. Heart three days ago     HPI/ROS  LIMITATION TO HISTORY   Select: : None  OUTSIDE HISTORIAN(S):  Parent Patient's family member is present.    Ely Rodas is a 48 y.o. female who presents to the ED for evaluation of post-op complications onset three days ago. The patient states she also has throat pain, chills, and difficulty swallowing, but denies any fever. Patient notes no antibiotic use. Patient is on 5 mg of Hydrocodone and has not taken any today. No alleviating or exacerbating factors were noted.    PAST MEDICAL HISTORY  Past Medical History:   Diagnosis Date    Anxiety disorder 03/11/2024    xanax prn    Arthritis     BRCA2 negative     Bronchitis     Depression 03/11/2024    Diabetes (HCC) 03/11/2024    pre-diabetic    Heart burn 2000    Hypothyroid     Pain 03/11/2024    knees and back    Psychiatric problem 1994       SURGICAL HISTORY  Past Surgical History:   Procedure Laterality Date    THYROIDECTOMY TOTAL N/A 4/3/2024    Procedure: TOTAL THYROIDECTOMY;  Surgeon: Leandra Heart M.D.;  Location: SURGERY SAME DAY [facility];  Service: General    ABDOMINAL HYSTERECTOMY TOTAL      KNEE ARTHROSCOPY Bilateral     LEEP      NO PERTINENT PAST SURGICAL HISTORY      Tubal and hysterectomy    TUBAL COAGULATION LAPAROSCOPIC BILATERAL         FAMILY HISTORY  Family History   Problem Relation Age of Onset    Diabetes Mother     Diabetes Father     Diabetes Maternal Grandmother     Dementia Maternal Grandmother     Glaucoma Maternal Grandmother     Ovarian Cancer Neg Hx     Tubal Cancer Neg Hx     Peritoneal Cancer Neg Hx     Colorectal Cancer Neg Hx     Breast Cancer Neg Hx        SOCIAL  HISTORY   reports that she has never smoked. She has been exposed to tobacco smoke. She has never used smokeless tobacco. She reports that she does not currently use alcohol. She reports that she does not currently use drugs after having used the following drugs: Marijuana, Inhaled, and Oral.    CURRENT MEDICATIONS  Discharge Medication List as of 4/6/2024  3:05 PM        CONTINUE these medications which have NOT CHANGED    Details   Calcium Carbonate Antacid (TUMS ULTRA 1000) 1000 MG Chew Tab Chew 2 Tablets 3 times a day with meals., Disp-90 Tablet, R-3, Normal      HYDROcodone-acetaminophen (NORCO) 5-325 MG Tab per tablet Take 1 Tablet by mouth every 6 hours as needed (pain) for up to 7 days., Disp-30 Tablet, R-0, Normal      oxyCODONE immediate-release (ROXICODONE) 5 MG Tab Take 1 Tablet by mouth every 6 hours as needed for Severe Pain (pain) for up to 7 days., Disp-30 Tablet, R-0, Normal      levothyroxine (SYNTHROID) 125 MCG Tab Take 1 tablet every day by oral route., Disp-30 Tablet, R-2, Normal      ROPINIRole (REQUIP) 0.5 MG Tab Take 0.5 mg by mouth every day., Historical Med      lansoprazole (PREVACID) 30 MG CAPSULE DELAYED RELEASE Take 1 capsule before a meal, Disp-90 Capsule, R-1, Normal      ALPRAZolam (XANAX) 1 MG Tab Take 1 Tablet by mouth 3 times a day as needed for Anxiety for up to 90 days. Indications: Feeling AnxiousIntentional increase to TID from BIDDisp-90 Tablet, R-2, Normal      !! Estradiol 0.025 MG/24HR PATCH BIWEEKLY Apply 1 patch to skin Transdermal every 3 days, Disp-10 Patch, R-3, Normal      progesterone (PROMETRIUM) 100 MG Cap Take 1 Capsule by mouth every day at bedtime, Disp-90 Capsule, R-3, Normal      !! Estradiol 0.025 MG/24HR PATCH BIWEEKLY Apply 1 patch to skin Transdermal every 3 days 30 day(s), Disp-10 Patch, R-3, Normal      ergocalciferol (DRISDOL) 45700 UNIT capsule Take 1 capsule by mouth every 7 days, Disp-12 Capsule, R-3, Normal      albuterol 108 (90 Base) MCG/ACT Aero  "Soln inhalation aerosol Inhale 2 Puffs every 6 hours as needed for Shortness of Breath., Historical Med      cyclobenzaprine (FLEXERIL) 5 mg tablet Take 1 Tablet by mouth 3 times a day as needed for Muscle Spasms or Moderate Pain., Disp-30 Tablet, R-0, Normal       !! - Potential duplicate medications found. Please discuss with provider.          ALLERGIES  Allergies   Allergen Reactions    Amoxicillin Rash     Hallucination and rash    Penicillins Rash     Hallucination and rash    Latex Rash     rash        PHYSICAL EXAM  /71   Pulse 66   Temp 36.3 °C (97.3 °F) (Temporal)   Resp 18   Ht 1.626 m (5' 4\")   Wt 101 kg (223 lb 1.7 oz)   SpO2 99%   BMI 38.30 kg/m²    Constitutional: Well developed, Well nourished, Mild distress,    HENT: 8 cm by 4 cm fluid collection around incision on anterior neck without any stridor or muffled voice,      Eyes: PERRLA, EOMI, Conjunctiva normal, No discharge.   Neck: No tenderness, Supple, No stridor.   Cardiovascular: Normal heart rate, Normal rhythm.   Thorax & Lungs: Clear to auscultation bilaterally, No respiratory distress.   Abdomen: Soft, No tenderness, No masses.   Skin: Warm, Dry, No rash.    Musculoskeletal: No major deformities noted.  Neurologic: Awake, alert. Moves all extremities spontaneously.  Psychiatric: Affect normal, Judgment normal, Mood normal.      DIAGNOSTIC STUDIES    Labs:   Results for orders placed or performed during the hospital encounter of 04/06/24   CBC WITH DIFFERENTIAL   Result Value Ref Range    WBC 8.9 4.8 - 10.8 K/uL    RBC 3.85 (L) 4.20 - 5.40 M/uL    Hemoglobin 10.5 (L) 12.0 - 16.0 g/dL    Hematocrit 31.2 (L) 37.0 - 47.0 %    MCV 81.0 (L) 81.4 - 97.8 fL    MCH 27.3 27.0 - 33.0 pg    MCHC 33.7 32.2 - 35.5 g/dL    RDW 39.3 35.9 - 50.0 fL    Platelet Count 238 164 - 446 K/uL    MPV 10.7 9.0 - 12.9 fL    Neutrophils-Polys 48.60 44.00 - 72.00 %    Lymphocytes 42.70 (H) 22.00 - 41.00 %    Monocytes 5.90 0.00 - 13.40 %    Eosinophils 2.30 " 0.00 - 6.90 %    Basophils 0.30 0.00 - 1.80 %    Immature Granulocytes 0.20 0.00 - 0.90 %    Nucleated RBC 0.00 0.00 - 0.20 /100 WBC    Neutrophils (Absolute) 4.31 1.82 - 7.42 K/uL    Lymphs (Absolute) 3.79 1.00 - 4.80 K/uL    Monos (Absolute) 0.52 0.00 - 0.85 K/uL    Eos (Absolute) 0.20 0.00 - 0.51 K/uL    Baso (Absolute) 0.03 0.00 - 0.12 K/uL    Immature Granulocytes (abs) 0.02 0.00 - 0.11 K/uL    NRBC (Absolute) 0.00 K/uL   COMP METABOLIC PANEL   Result Value Ref Range    Sodium 141 135 - 145 mmol/L    Potassium 3.9 3.6 - 5.5 mmol/L    Chloride 102 96 - 112 mmol/L    Co2 25 20 - 33 mmol/L    Anion Gap 14.0 7.0 - 16.0    Glucose 82 65 - 99 mg/dL    Bun 8 8 - 22 mg/dL    Creatinine 0.63 0.50 - 1.40 mg/dL    Calcium 9.0 8.5 - 10.5 mg/dL    Correct Calcium 9.2 8.5 - 10.5 mg/dL    AST(SGOT) 37 12 - 45 U/L    ALT(SGPT) 38 2 - 50 U/L    Alkaline Phosphatase 101 (H) 30 - 99 U/L    Total Bilirubin 0.4 0.1 - 1.5 mg/dL    Albumin 3.7 3.2 - 4.9 g/dL    Total Protein 6.8 6.0 - 8.2 g/dL    Globulin 3.1 1.9 - 3.5 g/dL    A-G Ratio 1.2 g/dL   ESTIMATED GFR   Result Value Ref Range    GFR (CKD-EPI) 109 >60 mL/min/1.73 m 2     INITIAL ASSESSMENT, COURSE AND PLAN  Differential diagnoses include but not limited to: Seroma vs Abscess     Care Narrative: Patient swelling anterior aspect of the neck consistent with a seroma, minimal erythema, discussed case with Dr. Smith, she came and evaluated the patient, discussed case with Dr. Heart, we got basic laboratory test that were nonspecific, will place the patient on Keflex, the patient will follow-up with Dr. Heart in the next couple of days, return sooner with increasing redness pain swelling or any other concerns.    11:53 AM - Patient was evaluated at bedside. I explained plan of care, including labs. She agrees to plan of care. I explained how seromas can form following surgery. Ordered for CMP and CBC w/ Diff to evaluate. Patient verbalizes understanding and support with my plan  of care.     12:13 PM - Paged Surgery.    12:14 PM - Spoke with Dr. Smith, (Surgery), about the patient's condition who agreed to evaluate the patient and discuss with Dr. Heart.    1:36 PM - Spoke with Dr. Smith, (Surgery), about the patient's condition who agreed to discharge the patient on Keflex if her lab results were reassuring.    DISPOSITION AND DISCUSSIONS    I have discussed management of the patient with the following physicians and CATRINA's:  Dr. Smith (Surgery)    Decision tools and prescription drugs considered including, but not limited to: Antibiotics   .        FINAL DIAGNOSIS  1. Fluid collection at surgical site, initial encounter         Bran CURRY (Scribe), am scribing for, and in the presence of, Neftali Villanueva M.D..    Electronically signed by: Bran Valdez (Scribe), 4/6/2024    Neftali CURRY M.D. personally performed the services described in this documentation, as scribed by Bran Valdez in my presence, and it is both accurate and complete.      The note accurately reflects work and decisions made by me.  Neftali Villanueva M.D.  4/6/2024  4:01 PM

## 2024-04-06 NOTE — ED NOTES
Pt ready for discharge. Discharge education was provided to pt by this RN. Pt verbalized understanding & all questions were answered. IV removed. Pt AoX 4. Pt ambulated independently with a steady gait out of the ED with all belongings. Pt encouraged to come back if symptoms worsen.

## 2024-04-06 NOTE — ED TRIAGE NOTES
Vitals:    04/06/24 0958   BP: 112/71   Pulse: 66   Resp: 18   Temp: 36.3 °C (97.3 °F)   SpO2: 99%     Chief Complaint   Patient presents with    Post-Op Complications     Pt reports she had her thyroid removed on 4/3. She has noticed her incision and surrounding area have gotten red, swollen, and hard prompting her to come to the ED, she did try and contact her surgeon but the office has not returned her call.

## 2024-04-16 ENCOUNTER — HOSPITAL ENCOUNTER (OUTPATIENT)
Dept: LAB | Facility: MEDICAL CENTER | Age: 49
End: 2024-04-16
Attending: SURGERY
Payer: COMMERCIAL

## 2024-04-16 LAB — CALCIUM SERPL-MCNC: 8.7 MG/DL (ref 8.4–10.2)

## 2024-04-16 PROCEDURE — 36415 COLL VENOUS BLD VENIPUNCTURE: CPT

## 2024-04-16 PROCEDURE — 82310 ASSAY OF CALCIUM: CPT

## 2024-04-24 ENCOUNTER — HOSPITAL ENCOUNTER (OUTPATIENT)
Dept: LAB | Facility: MEDICAL CENTER | Age: 49
End: 2024-04-24
Attending: INTERNAL MEDICINE
Payer: COMMERCIAL

## 2024-04-24 LAB — ESTRADIOL SERPL-MCNC: 23.5 PG/ML

## 2024-04-24 PROCEDURE — 84439 ASSAY OF FREE THYROXINE: CPT

## 2024-04-24 PROCEDURE — 36415 COLL VENOUS BLD VENIPUNCTURE: CPT

## 2024-04-24 PROCEDURE — 82670 ASSAY OF TOTAL ESTRADIOL: CPT

## 2024-04-24 PROCEDURE — 84443 ASSAY THYROID STIM HORMONE: CPT

## 2024-04-24 PROCEDURE — 84481 FREE ASSAY (FT-3): CPT

## 2024-04-25 LAB
T3FREE SERPL-MCNC: 3.11 PG/ML (ref 2–4.4)
T4 FREE SERPL-MCNC: 1.92 NG/DL (ref 0.93–1.7)
TSH SERPL DL<=0.005 MIU/L-ACNC: 0.11 UIU/ML (ref 0.38–5.33)

## 2024-05-02 ENCOUNTER — PHARMACY VISIT (OUTPATIENT)
Dept: PHARMACY | Facility: MEDICAL CENTER | Age: 49
End: 2024-05-02
Payer: COMMERCIAL

## 2024-05-02 PROCEDURE — RXMED WILLOW AMBULATORY MEDICATION CHARGE: Performed by: STUDENT IN AN ORGANIZED HEALTH CARE EDUCATION/TRAINING PROGRAM

## 2024-05-06 ENCOUNTER — PHARMACY VISIT (OUTPATIENT)
Dept: PHARMACY | Facility: MEDICAL CENTER | Age: 49
End: 2024-05-06
Payer: COMMERCIAL

## 2024-05-06 ENCOUNTER — HOSPITAL ENCOUNTER (EMERGENCY)
Facility: MEDICAL CENTER | Age: 49
End: 2024-05-06
Attending: EMERGENCY MEDICINE
Payer: COMMERCIAL

## 2024-05-06 ENCOUNTER — APPOINTMENT (OUTPATIENT)
Dept: RADIOLOGY | Facility: MEDICAL CENTER | Age: 49
End: 2024-05-06
Attending: EMERGENCY MEDICINE
Payer: COMMERCIAL

## 2024-05-06 VITALS
WEIGHT: 219.14 LBS | TEMPERATURE: 97.2 F | HEART RATE: 72 BPM | SYSTOLIC BLOOD PRESSURE: 114 MMHG | HEIGHT: 64 IN | OXYGEN SATURATION: 100 % | RESPIRATION RATE: 16 BRPM | BODY MASS INDEX: 37.41 KG/M2 | DIASTOLIC BLOOD PRESSURE: 59 MMHG

## 2024-05-06 DIAGNOSIS — K21.9 GASTROESOPHAGEAL REFLUX DISEASE WITHOUT ESOPHAGITIS: ICD-10-CM

## 2024-05-06 DIAGNOSIS — R07.9 CHEST PAIN, UNSPECIFIED TYPE: ICD-10-CM

## 2024-05-06 LAB
ALBUMIN SERPL BCP-MCNC: 4.1 G/DL (ref 3.2–4.9)
ALBUMIN/GLOB SERPL: 1.1 G/DL
ALP SERPL-CCNC: 118 U/L (ref 30–99)
ALT SERPL-CCNC: 19 U/L (ref 2–50)
ANION GAP SERPL CALC-SCNC: 11 MMOL/L (ref 7–16)
AST SERPL-CCNC: 16 U/L (ref 12–45)
BASOPHILS # BLD AUTO: 0.9 % (ref 0–1.8)
BASOPHILS # BLD: 0.05 K/UL (ref 0–0.12)
BILIRUB SERPL-MCNC: 1.4 MG/DL (ref 0.1–1.5)
BUN SERPL-MCNC: 11 MG/DL (ref 8–22)
CALCIUM ALBUM COR SERPL-MCNC: 9.3 MG/DL (ref 8.5–10.5)
CALCIUM SERPL-MCNC: 9.4 MG/DL (ref 8.4–10.2)
CHLORIDE SERPL-SCNC: 103 MMOL/L (ref 96–112)
CO2 SERPL-SCNC: 23 MMOL/L (ref 20–33)
CREAT SERPL-MCNC: 0.65 MG/DL (ref 0.5–1.4)
D DIMER PPP IA.FEU-MCNC: 0.42 UG/ML (FEU) (ref 0–0.5)
EKG IMPRESSION: NORMAL
EOSINOPHIL # BLD AUTO: 0.27 K/UL (ref 0–0.51)
EOSINOPHIL NFR BLD: 4.8 % (ref 0–6.9)
ERYTHROCYTE [DISTWIDTH] IN BLOOD BY AUTOMATED COUNT: 40 FL (ref 35.9–50)
GFR SERPLBLD CREATININE-BSD FMLA CKD-EPI: 108 ML/MIN/1.73 M 2
GLOBULIN SER CALC-MCNC: 3.8 G/DL (ref 1.9–3.5)
GLUCOSE SERPL-MCNC: 96 MG/DL (ref 65–99)
HCT VFR BLD AUTO: 41.8 % (ref 37–47)
HGB BLD-MCNC: 13.4 G/DL (ref 12–16)
IMM GRANULOCYTES # BLD AUTO: 0.01 K/UL (ref 0–0.11)
IMM GRANULOCYTES NFR BLD AUTO: 0.2 % (ref 0–0.9)
LYMPHOCYTES # BLD AUTO: 2.58 K/UL (ref 1–4.8)
LYMPHOCYTES NFR BLD: 45.7 % (ref 22–41)
MCH RBC QN AUTO: 26.1 PG (ref 27–33)
MCHC RBC AUTO-ENTMCNC: 32.1 G/DL (ref 32.2–35.5)
MCV RBC AUTO: 81.5 FL (ref 81.4–97.8)
MONOCYTES # BLD AUTO: 0.33 K/UL (ref 0–0.85)
MONOCYTES NFR BLD AUTO: 5.9 % (ref 0–13.4)
NEUTROPHILS # BLD AUTO: 2.4 K/UL (ref 1.82–7.42)
NEUTROPHILS NFR BLD: 42.5 % (ref 44–72)
NRBC # BLD AUTO: 0 K/UL
NRBC BLD-RTO: 0 /100 WBC (ref 0–0.2)
PLATELET # BLD AUTO: 300 K/UL (ref 164–446)
PMV BLD AUTO: 10.2 FL (ref 9–12.9)
POTASSIUM SERPL-SCNC: 4.1 MMOL/L (ref 3.6–5.5)
PROT SERPL-MCNC: 7.9 G/DL (ref 6–8.2)
RBC # BLD AUTO: 5.13 M/UL (ref 4.2–5.4)
SODIUM SERPL-SCNC: 137 MMOL/L (ref 135–145)
TROPONIN T SERPL-MCNC: <6 NG/L (ref 6–19)
WBC # BLD AUTO: 5.6 K/UL (ref 4.8–10.8)

## 2024-05-06 PROCEDURE — RXMED WILLOW AMBULATORY MEDICATION CHARGE: Performed by: EMERGENCY MEDICINE

## 2024-05-06 RX ORDER — ACETAMINOPHEN 500 MG
1000 TABLET ORAL ONCE
Status: COMPLETED | OUTPATIENT
Start: 2024-05-06 | End: 2024-05-06

## 2024-05-06 RX ORDER — CEPHALEXIN 500 MG/1
500 CAPSULE ORAL 4 TIMES DAILY
Status: SHIPPED | COMMUNITY
End: 2024-05-31

## 2024-05-06 RX ORDER — SUCRALFATE 1 G/1
1 TABLET ORAL EVERY 6 HOURS PRN
Qty: 40 TABLET | Refills: 0 | Status: SHIPPED | OUTPATIENT
Start: 2024-05-06

## 2024-05-06 RX ORDER — IBUPROFEN 200 MG
200 TABLET ORAL EVERY 6 HOURS PRN
COMMUNITY

## 2024-05-06 RX ADMIN — ACETAMINOPHEN 1000 MG: 500 TABLET, FILM COATED ORAL at 11:08

## 2024-05-06 RX ADMIN — LIDOCAINE HYDROCHLORIDE 30 ML: 20 SOLUTION ORAL; TOPICAL at 11:09

## 2024-05-06 ASSESSMENT — FIBROSIS 4 INDEX: FIB4 SCORE: 1.21

## 2024-05-06 NOTE — ED NOTES
Medication history reviewed with pt. Med rec is complete.  Allergies reviewed, per pt    Patient has had outpatient antibiotics in the last 30 days, pt started KEFLEX 500MG on 4/6/2024 for 7 day course.  Per pt reports that she finish course of antibiotic     Pt is not on any anticoagulants

## 2024-05-06 NOTE — ED TRIAGE NOTES
Pt comes in by herself  c/o R chest pain under breast area  started this past Friday   pain was just under breast area however now is rads into her back   recent Thyroid removal 4/3/2023 for possible CA

## 2024-05-06 NOTE — ED NOTES
Pt was evaluated by MD and orders rec'ed and done  SL placed w/ bld drawn, sent to lab  PO med's given per order   taken well  pt's VSS  in NAD  aware of POC

## 2024-05-06 NOTE — ED NOTES
Friday morning pt stated she bent forward to pick something up, when she stood up she felt sharp cramping from front to back left side. Pt states pain is worse on inhalation.

## 2024-05-06 NOTE — DISCHARGE INSTRUCTIONS
You were seen in the Emergency Department for chest pain.    EKG, labs, chest xray were completed without significant acute abnormalities.    Please use 1,000mg of tylenol every 6 hours as needed for pain.  Take 40 mg of omeprazole/Prilosec daily every day.  You may use Carafate as needed for ongoing pain.  Avoid spicy and acidic foods, alcohol, NSAIDs such as ibuprofen, Motrin, Aleve.    Please follow up with your primary care physician.    Return to the Emergency Department with worsening chest pain, trouble breathing, severe lightheadedness or fainting, or other concerns.

## 2024-05-06 NOTE — ED NOTES
Pt cleared for d/c  dischg instructions given to pt made aware that Rx carafate was sent to listed pharmacy  she is to  and take as directed,  verbally understands  d/c'ed to home in NAD

## 2024-05-06 NOTE — ED PROVIDER NOTES
ED Provider Note    CHIEF COMPLAINT  Chief Complaint   Patient presents with    Chest Pain     Started R side chest pain that started this past Friday  denies injure of cause   no Hx of such   pain now rads around to R side of back now   feels like breast is swollen due to pain     EXTERNAL RECORDS REVIEWED  Patient had thyroidectomy on 4/3/2024 by Dr. Heart.  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  She was seen in the ER a few days later due to concern for fluid around her incision site    HPI/ROS  LIMITATION TO HISTORY   Select: : None  OUTSIDE HISTORIAN(S):  None    Ely Jen Rodas is a 48 y.o. female who presents to the Emergency Department with chest pain.  Patient states symptoms started a few days ago when she bent down to pick something up and felt pain across the right side of her chest radiating to the back.  It has been constant since that time.  She has tried Prevacid and Tums as well as ibuprofen without any relief.  She does report some associated shortness of breath as well as the sensation of swelling over her right breast.  No fevers, coughing, leg swelling.  No prior history of blood clots.    PAST MEDICAL HISTORY  Past Medical History:   Diagnosis Date    Anxiety disorder 03/11/2024    xanax prn    Arthritis     BRCA2 negative     Bronchitis     Depression 03/11/2024    Diabetes (HCC) 03/11/2024    pre-diabetic    Heart burn 2000    Hypothyroid     Pain 03/11/2024    knees and back    Psychiatric problem 1994        SURGICAL HISTORY  Past Surgical History:   Procedure Laterality Date    THYROIDECTOMY TOTAL N/A 4/3/2024    Procedure: TOTAL THYROIDECTOMY;  Surgeon: Leandra Heart M.D.;  [facility]: SURGERY SAME DAY [facility];  Service: General    ABDOMINAL HYSTERECTOMY TOTAL      KNEE ARTHROSCOPY Bilateral     LEEP      NO PERTINENT PAST SURGICAL HISTORY      Tubal and hysterectomy    TUBAL COAGULATION LAPAROSCOPIC BILATERAL          FAMILY  HISTORY  Family History   Problem Relation Age of Onset    Diabetes Mother     Diabetes Father     Diabetes Maternal Grandmother     Dementia Maternal Grandmother     Glaucoma Maternal Grandmother     Ovarian Cancer Neg Hx     Tubal Cancer Neg Hx     Peritoneal Cancer Neg Hx     Colorectal Cancer Neg Hx     Breast Cancer Neg Hx        SOCIAL HISTORY   reports that she has never smoked. She has been exposed to tobacco smoke. She has never used smokeless tobacco. She reports that she does not currently use alcohol. She reports that she does not currently use drugs after having used the following drugs: Marijuana, Inhaled, and Oral.    CURRENT MEDICATIONS  Discharge Medication List as of 5/6/2024  2:30 PM        CONTINUE these medications which have NOT CHANGED    Details   oxyCODONE HCl 5 MG Tablet Abuse-Deterrent Take 5 mg by mouth every 6 hours as needed for Moderate Pain., Historical Med      ibuprofen (MOTRIN) 200 MG Tab Take 200 mg by mouth every 6 hours as needed. Indications: Pain, Historical Med      cephALEXin (KEFLEX) 500 MG Cap Take 500 mg by mouth 4 times a day. Pt started on 4/6/2024 for 7 day course, Historical Med      Calcium Carbonate Antacid (TUMS ULTRA 1000) 1000 MG Chew Tab Chew 2 Tablets 3 times a day with meals., Disp-90 Tablet, R-3, Normal      levothyroxine (SYNTHROID) 125 MCG Tab Take 1 tablet every day by oral route., Disp-30 Tablet, R-2, Normal      lansoprazole (PREVACID) 30 MG CAPSULE DELAYED RELEASE Take 1 capsule before a meal, Disp-90 Capsule, R-1, Normal      ALPRAZolam (XANAX) 1 MG Tab Take 1 Tablet by mouth 3 times a day as needed for Anxiety for up to 90 days. Indications: Feeling AnxiousIntentional increase to TID from BIDDisp-90 Tablet, R-2, Normal      Estradiol 0.025 MG/24HR PATCH BIWEEKLY Apply 1 patch to skin Transdermal every 3 days, Disp-10 Patch, R-3, Normal      progesterone (PROMETRIUM) 100 MG Cap Take 1 Capsule by mouth every day at bedtime, Disp-90 Capsule, R-3, Normal  "     ergocalciferol (DRISDOL) 48183 UNIT capsule Take 1 capsule by mouth every 7 days, Disp-12 Capsule, R-3, Normal      albuterol 108 (90 Base) MCG/ACT Aero Soln inhalation aerosol Inhale 2 Puffs every 6 hours as needed for Shortness of Breath., Historical Med             ALLERGIES  Amoxicillin, Penicillins, and Latex    PHYSICAL EXAM  /59   Pulse 72   Temp 36.2 °C (97.2 °F) (Temporal)   Resp 16   Ht 1.626 m (5' 4\")   Wt 99.4 kg (219 lb 2.2 oz)   SpO2 100%      Constitutional: Nontoxic appearing. Alert in no apparent distress.  HENT: Normocephalic, Atraumatic. Bilateral external ears normal. Nose normal.  Moist mucous membranes.  Oropharynx clear.  Eyes: Pupils are equal and reactive. Conjunctiva normal.   Neck: Supple, full range of motion  Heart: Regular rate and rhythm.  No murmurs.    Lungs: No respiratory distress, normal work of breathing. Lungs clear to auscultation bilaterally.  Abdomen Soft, no distention.  No tenderness to palpation.  Musculoskeletal: Atraumatic. No obvious deformities noted.  No lower extremity edema.  Skin: Warm, Dry.  No erythema, No rash.   Neurologic: Alert and oriented x3. Moving all extremities spontaneously without focal deficits.  Psychiatric: Affect normal, Mood normal, Appears appropriate and not intoxicated.      DIAGNOSTIC STUDIES / PROCEDURES    EKG  I have independently interpreted this EKG  Results for orders placed or performed during the hospital encounter of 24   EKG   Result Value Ref Range    Report       [facility] Emergency Dept[facility]    Test Date:  2024  Pt Name:    DAVID MICHAEL                  Department: [facility]  MRN:        [id number]                      Room:  Gender:     Female                       Technician: [id number]  :        1975                   Requested By:ER TRIAGE PROTOCOL  Order #:    [id number]                    Reading MD:    Measurements  Intervals                                Axis  Rate:       74   "                         P:          55  MT:         164                          QRS:        28  QRSD:       93                           T:          37  QT:         397  QTc:        441    Interpretive Statements  Sinus rhythm  RSR' in V1 or V2, right VCD or RVH  No previous ECG available for comparison         LABS  Labs Reviewed   CBC WITH DIFFERENTIAL - Abnormal; Notable for the following components:       Result Value    MCH 26.1 (*)     MCHC 32.1 (*)     Neutrophils-Polys 42.50 (*)     Lymphocytes 45.70 (*)     All other components within normal limits   COMP METABOLIC PANEL - Abnormal; Notable for the following components:    Alkaline Phosphatase 118 (*)     Globulin 3.8 (*)     All other components within normal limits   TROPONIN   D-DIMER   ESTIMATED GFR         RADIOLOGY  I have independently interpreted the diagnostic imaging associated with this visit and am waiting the final reading from the radiologist.   My preliminary interpretation is as follows: no infiltrate    Radiologist interpretation:  DX-CHEST-PORTABLE (1 VIEW)   Final Result      No acute cardiac or pulmonary abnormalities are identified.            COURSE & MEDICAL DECISION MAKING      ASSESSMENT, COURSE AND PLAN  Care Narrative: Patient with recent diagnosis of thyroid cancer who presents with acute onset of right-sided pleuritic pain over the last few days.  She is afebrile with normal vital signs on arrival.  No hypoxia or respiratory distress.  Her EKG does not show evidence of acute ischemia or arrhythmia.  Troponin is normal in the setting of multiple days of constant pain therefore doubt ACS.  History does not suggest aortic dissection.  Her D-dimer is normal making pulmonary embolism unlikely.  Chest x-ray does not show pneumothorax, pneumonia or pulmonary edema.  I suspect that this may be musculoskeletal or also from GI etiology.  Symptoms did improve with GI cocktail.    Upon reassessment, patient is resting comfortably with  normal vital signs.  No new complaints at this time.  Discussed results with patient and/or family as well as importance of primary care follow up.  Patient understands plan of care and strict return precautions for new or changing symptoms.     ADDITIONAL PROBLEM LIST  Problem #1: Acute chest pain - HEART score 2, unlikely cardiac in nature, discharged with continuation of omeprazole, carafate, tylenol    Problem #2: GERD - discharge with omeprazole and carafate as well as diet recommendations    DISPOSITION AND DISCUSSIONS    Decision tools and prescription drugs considered including, but not limited to: Pain Medications OTC medication should be sufficient .        DISPOSITION:  Patient will be discharged home in stable condition.    FOLLOW UP:  Myesha Stone M.D.  [address]  [address]  [facility] [id number] [phone]      As needed    [facility], Emergency Dept  [address]  [facility] [id number] [phone]    If symptoms worsen      OUTPATIENT MEDICATIONS:  Discharge Medication List as of 5/6/2024  2:30 PM        START taking these medications    Details   sucralfate (CARAFATE) 1 GM Tab Take 10 mL by mouth every 6 hours as needed (abdominal pain)., Disp-414 mL, R-0, Normal               FINAL DIAGNOSIS  1. Chest pain, unspecified type    2. Gastroesophageal reflux disease without esophagitis           Xeljanz Counseling: I discussed with the patient the risks of Xeljanz therapy including increased risk of infection, liver issues, headache, diarrhea, or cold symptoms. Live vaccines should be avoided. They were instructed to call if they have any problems.

## 2024-05-20 ENCOUNTER — APPOINTMENT (OUTPATIENT)
Dept: MEDICAL GROUP | Facility: MEDICAL CENTER | Age: 49
End: 2024-05-20
Payer: COMMERCIAL

## 2024-05-27 ENCOUNTER — APPOINTMENT (OUTPATIENT)
Dept: RADIOLOGY | Facility: MEDICAL CENTER | Age: 49
End: 2024-05-27
Attending: EMERGENCY MEDICINE
Payer: COMMERCIAL

## 2024-05-27 ENCOUNTER — HOSPITAL ENCOUNTER (EMERGENCY)
Facility: MEDICAL CENTER | Age: 49
End: 2024-05-27
Attending: EMERGENCY MEDICINE
Payer: COMMERCIAL

## 2024-05-27 VITALS
HEIGHT: 64 IN | RESPIRATION RATE: 15 BRPM | WEIGHT: 222.66 LBS | DIASTOLIC BLOOD PRESSURE: 77 MMHG | SYSTOLIC BLOOD PRESSURE: 134 MMHG | HEART RATE: 54 BPM | TEMPERATURE: 98.8 F | BODY MASS INDEX: 38.01 KG/M2 | OXYGEN SATURATION: 98 %

## 2024-05-27 DIAGNOSIS — R10.9 FLANK PAIN: ICD-10-CM

## 2024-05-27 DIAGNOSIS — R10.32 LEFT LOWER QUADRANT ABDOMINAL PAIN: ICD-10-CM

## 2024-05-27 LAB
ALBUMIN SERPL BCP-MCNC: 4.3 G/DL (ref 3.2–4.9)
ALBUMIN/GLOB SERPL: 1.3 G/DL
ALP SERPL-CCNC: 117 U/L (ref 30–99)
ALT SERPL-CCNC: 23 U/L (ref 2–50)
ANION GAP SERPL CALC-SCNC: 14 MMOL/L (ref 7–16)
APPEARANCE UR: CLEAR
AST SERPL-CCNC: 23 U/L (ref 12–45)
BASOPHILS # BLD AUTO: 2.6 % (ref 0–1.8)
BASOPHILS # BLD: 0.16 K/UL (ref 0–0.12)
BILIRUB SERPL-MCNC: 0.3 MG/DL (ref 0.1–1.5)
BILIRUB UR QL STRIP.AUTO: NEGATIVE
BUN SERPL-MCNC: 9 MG/DL (ref 8–22)
CALCIUM ALBUM COR SERPL-MCNC: 9.5 MG/DL (ref 8.5–10.5)
CALCIUM SERPL-MCNC: 9.7 MG/DL (ref 8.5–10.5)
CHLORIDE SERPL-SCNC: 102 MMOL/L (ref 96–112)
CO2 SERPL-SCNC: 22 MMOL/L (ref 20–33)
COLOR UR: YELLOW
CREAT SERPL-MCNC: 0.69 MG/DL (ref 0.5–1.4)
EOSINOPHIL # BLD AUTO: 0.16 K/UL (ref 0–0.51)
EOSINOPHIL NFR BLD: 2.6 % (ref 0–6.9)
ERYTHROCYTE [DISTWIDTH] IN BLOOD BY AUTOMATED COUNT: 38.5 FL (ref 35.9–50)
GFR SERPLBLD CREATININE-BSD FMLA CKD-EPI: 106 ML/MIN/1.73 M 2
GLOBULIN SER CALC-MCNC: 3.4 G/DL (ref 1.9–3.5)
GLUCOSE SERPL-MCNC: 108 MG/DL (ref 65–99)
GLUCOSE UR STRIP.AUTO-MCNC: NEGATIVE MG/DL
HCG SERPL QL: NEGATIVE
HCT VFR BLD AUTO: 41.8 % (ref 37–47)
HGB BLD-MCNC: 13.5 G/DL (ref 12–16)
KETONES UR STRIP.AUTO-MCNC: NEGATIVE MG/DL
LEUKOCYTE ESTERASE UR QL STRIP.AUTO: NEGATIVE
LIPASE SERPL-CCNC: 27 U/L (ref 11–82)
LYMPHOCYTES # BLD AUTO: 2.66 K/UL (ref 1–4.8)
LYMPHOCYTES NFR BLD: 42.2 % (ref 22–41)
MANUAL DIFF BLD: NORMAL
MCH RBC QN AUTO: 25.6 PG (ref 27–33)
MCHC RBC AUTO-ENTMCNC: 32.3 G/DL (ref 32.2–35.5)
MCV RBC AUTO: 79.2 FL (ref 81.4–97.8)
MICRO URNS: NORMAL
MONOCYTES # BLD AUTO: 0.16 K/UL (ref 0–0.85)
MONOCYTES NFR BLD AUTO: 2.6 % (ref 0–13.4)
MORPHOLOGY BLD-IMP: NORMAL
NEUTROPHILS # BLD AUTO: 3.15 K/UL (ref 1.82–7.42)
NEUTROPHILS NFR BLD: 50 % (ref 44–72)
NITRITE UR QL STRIP.AUTO: NEGATIVE
NRBC # BLD AUTO: 0 K/UL
NRBC BLD-RTO: 0 /100 WBC (ref 0–0.2)
PH UR STRIP.AUTO: 7 [PH] (ref 5–8)
PLATELET # BLD AUTO: 319 K/UL (ref 164–446)
PLATELET BLD QL SMEAR: NORMAL
PMV BLD AUTO: 10.4 FL (ref 9–12.9)
POTASSIUM SERPL-SCNC: 4.2 MMOL/L (ref 3.6–5.5)
PROT SERPL-MCNC: 7.7 G/DL (ref 6–8.2)
PROT UR QL STRIP: NEGATIVE MG/DL
RBC # BLD AUTO: 5.28 M/UL (ref 4.2–5.4)
RBC BLD AUTO: PRESENT
RBC UR QL AUTO: NEGATIVE
SODIUM SERPL-SCNC: 138 MMOL/L (ref 135–145)
SP GR UR STRIP.AUTO: <=1.005
UROBILINOGEN UR STRIP.AUTO-MCNC: 0.2 MG/DL
VARIANT LYMPHS BLD QL SMEAR: NORMAL
WBC # BLD AUTO: 6.3 K/UL (ref 4.8–10.8)

## 2024-05-27 PROCEDURE — 96376 TX/PRO/DX INJ SAME DRUG ADON: CPT

## 2024-05-27 PROCEDURE — 36415 COLL VENOUS BLD VENIPUNCTURE: CPT

## 2024-05-27 PROCEDURE — 81003 URINALYSIS AUTO W/O SCOPE: CPT

## 2024-05-27 PROCEDURE — 700117 HCHG RX CONTRAST REV CODE 255: Performed by: EMERGENCY MEDICINE

## 2024-05-27 PROCEDURE — 96375 TX/PRO/DX INJ NEW DRUG ADDON: CPT

## 2024-05-27 PROCEDURE — 83690 ASSAY OF LIPASE: CPT

## 2024-05-27 PROCEDURE — 99285 EMERGENCY DEPT VISIT HI MDM: CPT

## 2024-05-27 PROCEDURE — 96374 THER/PROPH/DIAG INJ IV PUSH: CPT

## 2024-05-27 PROCEDURE — 85007 BL SMEAR W/DIFF WBC COUNT: CPT

## 2024-05-27 PROCEDURE — 85027 COMPLETE CBC AUTOMATED: CPT

## 2024-05-27 PROCEDURE — 84703 CHORIONIC GONADOTROPIN ASSAY: CPT

## 2024-05-27 PROCEDURE — 700102 HCHG RX REV CODE 250 W/ 637 OVERRIDE(OP): Performed by: EMERGENCY MEDICINE

## 2024-05-27 PROCEDURE — 700111 HCHG RX REV CODE 636 W/ 250 OVERRIDE (IP): Performed by: EMERGENCY MEDICINE

## 2024-05-27 PROCEDURE — 80053 COMPREHEN METABOLIC PANEL: CPT

## 2024-05-27 PROCEDURE — A9270 NON-COVERED ITEM OR SERVICE: HCPCS | Performed by: EMERGENCY MEDICINE

## 2024-05-27 PROCEDURE — 71275 CT ANGIOGRAPHY CHEST: CPT

## 2024-05-27 PROCEDURE — 74176 CT ABD & PELVIS W/O CONTRAST: CPT

## 2024-05-27 RX ORDER — MORPHINE SULFATE 4 MG/ML
4 INJECTION INTRAVENOUS ONCE
Status: COMPLETED | OUTPATIENT
Start: 2024-05-27 | End: 2024-05-27

## 2024-05-27 RX ORDER — ONDANSETRON 2 MG/ML
4 INJECTION INTRAMUSCULAR; INTRAVENOUS ONCE
Status: COMPLETED | OUTPATIENT
Start: 2024-05-27 | End: 2024-05-27

## 2024-05-27 RX ORDER — KETOROLAC TROMETHAMINE 15 MG/ML
15 INJECTION, SOLUTION INTRAMUSCULAR; INTRAVENOUS ONCE
Status: COMPLETED | OUTPATIENT
Start: 2024-05-27 | End: 2024-05-27

## 2024-05-27 RX ORDER — METHOCARBAMOL 750 MG/1
750 TABLET, FILM COATED ORAL ONCE
Status: COMPLETED | OUTPATIENT
Start: 2024-05-27 | End: 2024-05-27

## 2024-05-27 RX ORDER — IBUPROFEN 600 MG/1
600 TABLET ORAL EVERY 8 HOURS PRN
Qty: 9 TABLET | Refills: 0 | Status: SHIPPED | OUTPATIENT
Start: 2024-05-27 | End: 2024-05-31

## 2024-05-27 RX ORDER — METHOCARBAMOL 750 MG/1
750 TABLET, FILM COATED ORAL 4 TIMES DAILY
Qty: 12 TABLET | Refills: 0 | Status: SHIPPED | OUTPATIENT
Start: 2024-05-27 | End: 2024-05-31

## 2024-05-27 RX ADMIN — MORPHINE SULFATE 4 MG: 4 INJECTION INTRAVENOUS at 11:25

## 2024-05-27 RX ADMIN — ONDANSETRON 4 MG: 2 INJECTION INTRAMUSCULAR; INTRAVENOUS at 11:25

## 2024-05-27 RX ADMIN — IOHEXOL 100 ML: 350 INJECTION, SOLUTION INTRAVENOUS at 12:49

## 2024-05-27 RX ADMIN — KETOROLAC TROMETHAMINE 15 MG: 15 INJECTION, SOLUTION INTRAMUSCULAR; INTRAVENOUS at 10:10

## 2024-05-27 RX ADMIN — METHOCARBAMOL 750 MG: 750 TABLET ORAL at 13:30

## 2024-05-27 RX ADMIN — ONDANSETRON 4 MG: 2 INJECTION INTRAMUSCULAR; INTRAVENOUS at 10:10

## 2024-05-27 ASSESSMENT — PAIN DESCRIPTION - PAIN TYPE
TYPE: ACUTE PAIN
TYPE: ACUTE PAIN

## 2024-05-27 ASSESSMENT — FIBROSIS 4 INDEX: FIB4 SCORE: 0.59

## 2024-05-27 NOTE — ED TRIAGE NOTES
"Chief Complaint   Patient presents with    Flank Pain     Patient reports L side flank pain that started approx 11pm last night. Patient reports taking Tylenol at home with no relief    Abdominal Pain     Patient reports her flank pain radiates into the LLQ abd       49 yo female to triage for above complaint.   Protocol ordered    Pt is alert and oriented, speaking in full sentences, follows commands and responds appropriately to questions.     Patient placed back in lobby and educated on triage process. Asked to inform RN of any changes.    /87   Pulse 69   Temp 36.8 °C (98.2 °F) (Oral)   Resp 18   Ht 1.626 m (5' 4\")   Wt 101 kg (222 lb 10.6 oz)   SpO2 98%   BMI 38.22 kg/m²     "

## 2024-05-27 NOTE — DISCHARGE INSTRUCTIONS
Return to the ER for any worsening back pain, worsening abdominal pain, pain radiating down your leg, numbness/tingling/weakness of your legs, loss of bowel or bladder control, numbness around your genital area, fevers over 100.4, shaking chills, nausea, vomiting, diarrhea, urinary symptoms, or for any concerns.    Use ice to help with the pain.    Follow-up with your primary care doctor within the next 1 to 2 days.  Please call for appointment.

## 2024-05-27 NOTE — ED NOTES
Pt back from CT. Pt states pain has decreased to 6/10, although has not fully resolved.    Pt provided with warm blanket

## 2024-05-27 NOTE — ED PROVIDER NOTES
ED Provider Note    CHIEF COMPLAINT  Chief Complaint   Patient presents with    Flank Pain     Patient reports L side flank pain that started approx 11pm last night. Patient reports taking Tylenol at home with no relief    Abdominal Pain     Patient reports her flank pain radiates into the LLQ abd       EXTERNAL RECORDS REVIEWED  Outpatient Notes patient was seen by her endocrinologist May 3, 2024 for postoperative hypothyroidism with history of thyroid cancer    HPI/ROS  LIMITATION TO HISTORY   Select: : None  OUTSIDE HISTORIAN(S):  None    Ely Jen Rodas is a 48 y.o. female who presents to the ER with complaint of left-sided flank pain with radiation into the abdomen which started suddenly last night at 11 PM.  No history of similar pain in the past.  Patient says she was completely fine until 11 PM when she developed this pain.  She is unable to find comfortable position.  The pain has been constant.  She has had nausea but no vomiting.  She has been urinating frequently since onset of pain but reports it is clear in color.  No dark urine or obvious hematuria.  No cloudy or foul-smelling urine.  No dysuria.  She has some history of constipation.  No diarrhea.  Patient has had total hysterectomy.  Patient states that she noticed the pain when she bent over to  a  that was in its crib.  She had gotten out of her bed around 11 PM.  She recalls that by the time she got to the baby's room she may have had some discomfort.  She is unsure if she had any pain when she swung her legs out of her bed and stood up.  However, she distinctly remembers pain in her left back when she lifted up the .  Pain radiates down into the left buttock.  It does not radiate below that.  No loss of bowel or bladder control.  No saddle anesthesia.  No numbness, tingling weakness of extremities.  No rash.  Patient took some Tylenol earlier this morning without any improvement.  No history of similar pain in the past.   She said she was here earlier this month for right-sided chest pain.  That chest pain is since resolved.    PAST MEDICAL HISTORY   has a past medical history of Anxiety disorder (03/11/2024), Arthritis, BRCA2 negative, Bronchitis (), Depression (03/11/2024), Diabetes (HCC) (03/11/2024), Heart burn (2000), Hypothyroid, Pain (03/11/2024), and Psychiatric problem (1994).    SURGICAL HISTORY   has a past surgical history that includes tubal coagulation laparoscopic bilateral; abdominal hysterectomy total; leep; knee arthroscopy (Bilateral); no pertinent past surgical history; and thyroidectomy total (N/A, 4/3/2024).    FAMILY HISTORY  Family History   Problem Relation Age of Onset    Diabetes Mother     Diabetes Father     Diabetes Maternal Grandmother     Dementia Maternal Grandmother     Glaucoma Maternal Grandmother     Ovarian Cancer Neg Hx     Tubal Cancer Neg Hx     Peritoneal Cancer Neg Hx     Colorectal Cancer Neg Hx     Breast Cancer Neg Hx        SOCIAL HISTORY  Social History     Tobacco Use    Smoking status: Never     Passive exposure: Current    Smokeless tobacco: Never   Vaping Use    Vaping status: Some Days    Substances: Flavoring    Devices: Disposable    Passive vaping exposure: Yes   Substance and Sexual Activity    Alcohol use: Not Currently    Drug use: Not Currently     Types: Marijuana, Inhaled, Oral     Comment: stopped approximately 1 month ago    Sexual activity: Not Currently     Partners: Male     Birth control/protection: Female Sterilization       CURRENT MEDICATIONS  Home Medications       Reviewed by Scarlett Lennon R.N. (Registered Nurse) on 05/27/24 at 0835  Med List Status: <None>     Medication Last Dose Status   albuterol 108 (90 Base) MCG/ACT Aero Soln inhalation aerosol  Active   ALPRAZolam (XANAX) 1 MG Tab  Active   Calcium Carbonate Antacid (TUMS ULTRA 1000) 1000 MG Chew Tab  Active   cephALEXin (KEFLEX) 500 MG Cap  Active   ergocalciferol (DRISDOL) 94165 UNIT capsule   "Active   Estradiol 0.025 MG/24HR PATCH BIWEEKLY  Active   ibuprofen (MOTRIN) 200 MG Tab  Active   lansoprazole (PREVACID) 30 MG CAPSULE DELAYED RELEASE  Active   levothyroxine (SYNTHROID) 125 MCG Tab  Active   oxyCODONE HCl 5 MG Tablet Abuse-Deterrent  Active   progesterone (PROMETRIUM) 100 MG Cap  Active   sucralfate (CARAFATE) 1 GM Tab  Active                    ALLERGIES  Allergies   Allergen Reactions    Amoxicillin Hives and Rash     .    Latex Hives and Rash     rash    Penicillins Hives and Rash     .       PHYSICAL EXAM  VITAL SIGNS: /77   Pulse (!) 54   Temp 37.1 °C (98.8 °F) (Temporal)   Resp 15   Ht 1.626 m (5' 4\")   Wt 101 kg (222 lb 10.6 oz)   SpO2 98%   BMI 38.22 kg/m²    Constitutional:  Well developed, well nourished; moderate to severe distress.  Pacing around the room.  Appears uncomfortable.  Elevated BMI.  HENT: Normocephalic, Atraumatic, Bilateral external ears normal, oropharyngeal examination deferred due to COVID-19 outbreak and lack of oropharyngeal complaint.  Eyes: PERRL, EOMI, Conjunctiva normal, No discharge.   Neck: Normal range of motion, supple, nontender  Lymphatic: No lymphadenopathy noted.   Cardiovascular: Normal heart rate, Normal rhythm, No murmurs, rubs or gallops   Thorax & Lungs: CTA=bilaterally;  No respiratory distress,  No wheezing rales, or rhonchi; No chest tenderness. No crepitus or subQ air  Abdomen: soft, decreased bowel sounds.  Tender in the left lower abdomen.  No guarding no rebound, no masses, no pulsatile mass, no tenderness, no distention  Skin: Warm, Dry, No erythema, No rash.   Back: There is some tenderness into the left sacrum and buttock.  No midline lumbar spine tenderness.  Patient does have some tenderness to the lower left lumbar paraspinous muscles.  No CVA tenderness.   Extremities: 2+ dp and pt pulses bilateral LEs;  Nontender; no pretibial edema  Neurologic: Alert & oriented x 4, clear speech, lower extremity strength are 5 out of 5 " and equal bilaterally testing of dorsiflexors, plantar flexors, quadriceps and hamstrings.  Sensation is intact to light touch.  Negative straight leg raise on the left.  Psychiatric: appropriate, normal affect     EKG/LABS  Results for orders placed or performed during the hospital encounter of 05/27/24   CBC with Differential   Result Value Ref Range    WBC 6.3 4.8 - 10.8 K/uL    RBC 5.28 4.20 - 5.40 M/uL    Hemoglobin 13.5 12.0 - 16.0 g/dL    Hematocrit 41.8 37.0 - 47.0 %    MCV 79.2 (L) 81.4 - 97.8 fL    MCH 25.6 (L) 27.0 - 33.0 pg    MCHC 32.3 32.2 - 35.5 g/dL    RDW 38.5 35.9 - 50.0 fL    Platelet Count 319 164 - 446 K/uL    MPV 10.4 9.0 - 12.9 fL    Neutrophils-Polys 50.00 44.00 - 72.00 %    Lymphocytes 42.20 (H) 22.00 - 41.00 %    Monocytes 2.60 0.00 - 13.40 %    Eosinophils 2.60 0.00 - 6.90 %    Basophils 2.60 (H) 0.00 - 1.80 %    Nucleated RBC 0.00 0.00 - 0.20 /100 WBC    Neutrophils (Absolute) 3.15 1.82 - 7.42 K/uL    Lymphs (Absolute) 2.66 1.00 - 4.80 K/uL    Monos (Absolute) 0.16 0.00 - 0.85 K/uL    Eos (Absolute) 0.16 0.00 - 0.51 K/uL    Baso (Absolute) 0.16 (H) 0.00 - 0.12 K/uL    NRBC (Absolute) 0.00 K/uL   Complete Metabolic Panel   Result Value Ref Range    Sodium 138 135 - 145 mmol/L    Potassium 4.2 3.6 - 5.5 mmol/L    Chloride 102 96 - 112 mmol/L    Co2 22 20 - 33 mmol/L    Anion Gap 14.0 7.0 - 16.0    Glucose 108 (H) 65 - 99 mg/dL    Bun 9 8 - 22 mg/dL    Creatinine 0.69 0.50 - 1.40 mg/dL    Calcium 9.7 8.5 - 10.5 mg/dL    Correct Calcium 9.5 8.5 - 10.5 mg/dL    AST(SGOT) 23 12 - 45 U/L    ALT(SGPT) 23 2 - 50 U/L    Alkaline Phosphatase 117 (H) 30 - 99 U/L    Total Bilirubin 0.3 0.1 - 1.5 mg/dL    Albumin 4.3 3.2 - 4.9 g/dL    Total Protein 7.7 6.0 - 8.2 g/dL    Globulin 3.4 1.9 - 3.5 g/dL    A-G Ratio 1.3 g/dL   Lipase   Result Value Ref Range    Lipase 27 11 - 82 U/L   Urinalysis    Specimen: Urine   Result Value Ref Range    Color Yellow     Character Clear     Specific Gravity <=1.005  <1.035    Ph 7.0 5.0 - 8.0    Glucose Negative Negative mg/dL    Ketones Negative Negative mg/dL    Protein Negative Negative mg/dL    Bilirubin Negative Negative    Urobilinogen, Urine 0.2 Negative    Nitrite Negative Negative    Leukocyte Esterase Negative Negative    Occult Blood Negative Negative    Micro Urine Req see below    HCG QUAL SERUM   Result Value Ref Range    Beta-Hcg Qualitative Serum Negative Negative   ESTIMATED GFR   Result Value Ref Range    GFR (CKD-EPI) 106 >60 mL/min/1.73 m 2   DIFFERENTIAL MANUAL   Result Value Ref Range    Manual Diff Status PERFORMED    PERIPHERAL SMEAR REVIEW   Result Value Ref Range    Peripheral Smear Review see below    PLATELET ESTIMATE   Result Value Ref Range    Plt Estimation Normal    MORPHOLOGY   Result Value Ref Range    RBC Morphology Present     Reactive Lymphocytes Few           RADIOLOGY/PROCEDURES   I have independently interpreted the diagnostic imaging associated with this visit and am waiting the final reading from the radiologist.     My preliminary interpretation is as follows: ER MD is reviewed the patient's CT scan without contrast.  No obvious hydronephrosis or ureteral stones.  No obvious obstruction.    Radiologist interpretation:  CT-CTA COMPLETE THORACOABDOMINAL AORTA   Final Result      1.  No evidence of thoracic or abdominal aortic aneurysm or dissection.   2.  Kidneys enhance symmetrically with no evidence of hydronephrosis and no CT evidence of pyelonephritis.            CT-RENAL COLIC EVALUATION(A/P W/O)   Final Result      1.  No renal stone or hydronephrosis.   2.  No secondary signs of acute appendicitis, however the appendix is not well visualized.   3.  Postoperative change from prior surgery   4.  No focal mesenteric inflammatory process.             COURSE & MEDICAL DECISION MAKING    ASSESSMENT, COURSE AND PLAN  Care Narrative: Patient presents to the ER complaining of left-sided flank pain/low back pain which began around 11 PM  last night.  Patient says she was laying in bed.  She got out of bed to go into the room next-door to  her  grandbaby.  She was reaching into the crib to  the child, she felt a twinge in her left low back.  Initially she said no trauma or injury.  She said the pain just started out of the blue.  However she thought more about it she recalls that it seemed to start as she was lifting the child.  Pain radiates around into the left abdomen.  It radiates down into the left buttock.  She has some tenderness in the left buttock and sacral area.  There is no real tenderness in the lumbar spine.  She has not had any falls.  There is no rash.  No concern for shingles.  The patient denies any pain radiating down below the buttock.  No complaints of numbness, tingling weakness of extremities.  No saddle anesthesia or loss of bowel or bladder control.  Her neurologic examination is normal.  No concern for cord compression, cauda equina syndrome, epidural abscess or epidural hematoma.  Patient was unable to find a comfortable position here in the ER.  She was pacing around the room upon ER MD arrival to examine her.  She looked very much like ureteral stone.  I was highly suspicious for ureteral stone given her presentation.  For this reason she underwent urinalysis, blood work, and a CT scan renal stone protocol.  Her CT scan renal stone protocol is negative for ureteral stone.  No hydronephrosis.  Urine is completely clear.  No evidence of UTI.  There is no blood in her urine.  Blood work is unremarkable.  Patient was here in the ER earlier this month complaining of right-sided chest pain.  She said that she had the chest pain for a while.  That resolved.  Of course now that she is here with back pain, I considered the possibility of a dissection since she recently had this chest pain.  For this reason she is got sent back to the scanner for CT scan of the thoracoabdominal oriented to rule out dissection.   "There is no dissection.  No inflammatory change in the abdomen.  She did have some tenderness in the left lower quadrant but there is no signs of diverticulitis, colitis, perforation, obstruction, or any other dangerous intra-abdominal pathology.  She has had a \"total hysterectomy\" and reports that her uterus and her ovaries have been taken out.  She is not pregnant.  No concern for any gynecologic pathology at this time.  Patient is feeling better after the Toradol, morphine and Robaxin here in the ER.  At this time I suspect she may have a musculoskeletal cause of her pain, especially since it seemed to begin after she reached over into a crib to  an infant..  Certainly there does not appear to be anything dangerous at this time.  I suggested she use ice.  I will send her home with Motrin and Robaxin.  She is to follow-up with her regular doctor.  Perhaps she would benefit from physical therapy.  She is safe and stable for outpatient management discharge home at this time.  She has been given, however, Steri-Stripped return precautions and discharge instructions and she understands treatment plan and follow-up.       Patient is feeling a little better with the Toradol.  She is more comfortable but still says she has pain.  She was given morphine and is feeling much better.  She ambulates to the bathroom without any distress.  After morphine she was reevaluated and she is comfortably laying on the gurney.    ADDITIONAL PROBLEMS MANAGED  Problem #1: Left flank pain with radiation into the left abdomen.    DISPOSITION AND DISCUSSIONS  I have discussed management of the patient with the following physicians and CATRINA's: None    Discussion of management with other Q or appropriate source(s): None     Escalation of care considered, and ultimately not performed: Patient does not have ureteral stone.  No need for urology consultation.  She has no complaint of radiculopathy, numbness, tingling or weakness of " extremities.  Her neurologic examination is normal.  No need for neurosurgical consultation.    Barriers to care at this time, including but not limited to:  None known .     Decision tools and prescription drugs considered including, but not limited to: Pain Medications patient will be sent home with Motrin and Robaxin. .    FINAL DIAGNOSIS  1. Flank pain Acute   2. Left lower quadrant abdominal pain Acute        This dictation has been created using voice recognition software. The accuracy of the dictation is limited by the abilities of the software. I expect there may be some errors of grammar and possibly content. I made every attempt to manually correct the errors within my dictation. However, errors related to voice recognition software may still exist and should be interpreted within the appropriate context.     Electronically signed by: Adriana Vega M.D., 5/27/2024 9:25 AM

## 2024-05-27 NOTE — ED NOTES
Patient identity verified in lobby. Patient ambulated independently with steady gait to yellow 55.    This RN agrees with triage note. Patient connected to continuous monitor with call light and personal belongings within reach. Beni locked and in the lowest position.

## 2024-05-27 NOTE — ED NOTES
Pt back from CT. Pt states pain feels much better after receiving hot pack for right flank area.    Beni locked and in the lowest position. Pt connected to continuous monitor with call light and personal belongings within reach

## 2024-05-28 ENCOUNTER — PHARMACY VISIT (OUTPATIENT)
Dept: PHARMACY | Facility: MEDICAL CENTER | Age: 49
End: 2024-05-28
Payer: COMMERCIAL

## 2024-05-28 PROCEDURE — RXMED WILLOW AMBULATORY MEDICATION CHARGE: Performed by: EMERGENCY MEDICINE

## 2024-05-31 ENCOUNTER — OFFICE VISIT (OUTPATIENT)
Dept: MEDICAL GROUP | Facility: MEDICAL CENTER | Age: 49
End: 2024-05-31
Payer: COMMERCIAL

## 2024-05-31 VITALS
DIASTOLIC BLOOD PRESSURE: 78 MMHG | HEART RATE: 77 BPM | SYSTOLIC BLOOD PRESSURE: 130 MMHG | HEIGHT: 64 IN | OXYGEN SATURATION: 91 % | WEIGHT: 218 LBS | TEMPERATURE: 97.3 F | BODY MASS INDEX: 37.22 KG/M2

## 2024-05-31 DIAGNOSIS — E03.9 ACQUIRED HYPOTHYROIDISM: ICD-10-CM

## 2024-05-31 DIAGNOSIS — Z02.89 ENCOUNTER FOR COMPLETION OF FORM WITH PATIENT: ICD-10-CM

## 2024-05-31 DIAGNOSIS — F41.9 ANXIETY: ICD-10-CM

## 2024-05-31 DIAGNOSIS — K21.9 GASTROESOPHAGEAL REFLUX DISEASE WITHOUT ESOPHAGITIS: ICD-10-CM

## 2024-05-31 PROCEDURE — RXMED WILLOW AMBULATORY MEDICATION CHARGE: Performed by: STUDENT IN AN ORGANIZED HEALTH CARE EDUCATION/TRAINING PROGRAM

## 2024-05-31 RX ORDER — ALPRAZOLAM 1 MG/1
1 TABLET ORAL 3 TIMES DAILY PRN
Qty: 90 TABLET | Refills: 2 | Status: SHIPPED | OUTPATIENT
Start: 2024-05-31 | End: 2024-06-30

## 2024-05-31 RX ORDER — PANTOPRAZOLE SODIUM 40 MG/1
40 TABLET, DELAYED RELEASE ORAL DAILY
Qty: 90 TABLET | Refills: 0 | Status: SHIPPED | OUTPATIENT
Start: 2024-05-31

## 2024-05-31 ASSESSMENT — FIBROSIS 4 INDEX: FIB4 SCORE: 0.72

## 2024-05-31 NOTE — PROGRESS NOTES
"Subjective:     CC: GERD, anxiety, hypothyroidism    HPI:   [facility] presents today with    Problem   Gastroesophageal Reflux Disease Without Esophagitis    This is a chronic condition.  Previously well-controlled with Prevacid but insurance stopped paying for this.     Anxiety    This is a chronic condition.  She is doing well with Xanax 1 mg 3 times daily.  PDMP reviewed, controlled substance agreement on file, urine drug screen on file     Acquired Hypothyroidism    This is a chronic condition, currently on levothyroxine 125 mcg daily.  She follows with endocrinology.  She does have thyroid nodules and has an ablation scheduled.         ROS:  ROS    Objective:     Exam:  /78   Pulse 77   Temp 36.3 °C (97.3 °F)   Ht 1.626 m (5' 4\")   Wt 98.9 kg (218 lb)   SpO2 91%   BMI 37.42 kg/m²  Body mass index is 37.42 kg/m².    Physical Exam  Vitals reviewed.   Constitutional:       General: She is not in acute distress.     Appearance: She is not toxic-appearing.      Comments: Exam through observation only   HENT:      Head: Normocephalic and atraumatic.      Right Ear: External ear normal.      Left Ear: External ear normal.   Eyes:      General:         Right eye: No discharge.         Left eye: No discharge.      Extraocular Movements: Extraocular movements intact.      Conjunctiva/sclera: Conjunctivae normal.   Pulmonary:      Effort: Pulmonary effort is normal. No respiratory distress.   Skin:     General: Skin is warm and dry.   Neurological:      Mental Status: She is alert.   Psychiatric:         Mood and Affect: Mood normal.         Behavior: Behavior normal.         Thought Content: Thought content normal.         Judgment: Judgment normal.           Assessment & Plan:     48 y.o. female with the following -     1. Anxiety  Chronic condition, well-controlled, refill given  - ALPRAZolam (XANAX) 1 MG Tab; Take 1 Tablet by mouth 3 times a day as needed for Anxiety for up to 30 days. Indications: Feeling " Anxious  Dispense: 90 Tablet; Refill: 2    2. Acquired hypothyroidism  Continue to follow with endocrinology    3. Gastroesophageal reflux disease without esophagitis  - pantoprazole (PROTONIX) 40 MG Tablet Delayed Response; Take 1 Tablet by mouth every day.  Dispense: 90 Tablet; Refill: 0    4. Encounter for completion of form with patient  Form completed for work off for ablation    No follow-ups on file.    Please note that this dictation was created using voice recognition software. I have made every reasonable attempt to correct obvious errors, but I expect that there are errors of grammar and possibly content that I did not discover before finalizing the note.

## 2024-06-02 ENCOUNTER — PHARMACY VISIT (OUTPATIENT)
Dept: PHARMACY | Facility: MEDICAL CENTER | Age: 49
End: 2024-06-02
Payer: COMMERCIAL

## 2024-06-17 ENCOUNTER — HOSPITAL ENCOUNTER (OUTPATIENT)
Dept: LAB | Facility: MEDICAL CENTER | Age: 49
End: 2024-06-17
Attending: SURGERY
Payer: COMMERCIAL

## 2024-06-17 ENCOUNTER — HOSPITAL ENCOUNTER (OUTPATIENT)
Dept: RADIOLOGY | Facility: MEDICAL CENTER | Age: 49
End: 2024-06-17
Attending: INTERNAL MEDICINE
Payer: COMMERCIAL

## 2024-06-17 DIAGNOSIS — Z85.850 PERSONAL HISTORY OF MALIGNANT NEOPLASM OF THYROID: ICD-10-CM

## 2024-06-17 LAB — CALCIUM SERPL-MCNC: 9.3 MG/DL (ref 8.5–10.5)

## 2024-06-17 PROCEDURE — 700111 HCHG RX REV CODE 636 W/ 250 OVERRIDE (IP)

## 2024-06-17 PROCEDURE — 82310 ASSAY OF CALCIUM: CPT

## 2024-06-17 PROCEDURE — 36415 COLL VENOUS BLD VENIPUNCTURE: CPT

## 2024-06-17 NOTE — PROGRESS NOTES
Patient arrived for Thyrogen 0.9 mg injection.  Patient educated regarding medication, administration, and possible side effects. Patient consented to injection.  Thyrogen 0.9 mg IM injection administered to Left dorsogluteal site at 1305 on 6/17/24 by this RN. Patient monitored for 15 minutes post injection. No signs or symptoms of an adverse reaction. Patient discharged home.

## 2024-06-18 ENCOUNTER — HOSPITAL ENCOUNTER (OUTPATIENT)
Dept: RADIOLOGY | Facility: MEDICAL CENTER | Age: 49
End: 2024-06-18
Attending: INTERNAL MEDICINE
Payer: COMMERCIAL

## 2024-06-18 PROCEDURE — 700111 HCHG RX REV CODE 636 W/ 250 OVERRIDE (IP)

## 2024-06-18 NOTE — PROGRESS NOTES
Patient arrived for Thyrogen 0.9 mg injection.  Patient educated regarding medication, administration, and possible side effects. Patient consented to injection.  Thyrogen 0.9 mg IM injection administered to right dorsogluteal site at 1310 on 6/18/24 by this RN. Patient reported no problems with yesterday's injection.  Patient discharged home.

## 2024-06-19 ENCOUNTER — HOSPITAL ENCOUNTER (OUTPATIENT)
Dept: RADIOLOGY | Facility: MEDICAL CENTER | Age: 49
End: 2024-06-19
Attending: INTERNAL MEDICINE
Payer: COMMERCIAL

## 2024-06-19 DIAGNOSIS — Z85.850 PERSONAL HISTORY OF MALIGNANT NEOPLASM OF THYROID: ICD-10-CM

## 2024-06-19 PROCEDURE — A9517 I131 IODIDE CAP, RX: HCPCS

## 2024-06-27 ENCOUNTER — OFFICE VISIT (OUTPATIENT)
Dept: MEDICAL GROUP | Facility: MEDICAL CENTER | Age: 49
End: 2024-06-27
Payer: COMMERCIAL

## 2024-06-27 VITALS
OXYGEN SATURATION: 99 % | DIASTOLIC BLOOD PRESSURE: 52 MMHG | RESPIRATION RATE: 16 BRPM | BODY MASS INDEX: 37.52 KG/M2 | HEIGHT: 64 IN | TEMPERATURE: 97.5 F | SYSTOLIC BLOOD PRESSURE: 90 MMHG | WEIGHT: 219.8 LBS | HEART RATE: 66 BPM

## 2024-06-27 DIAGNOSIS — E04.1 THYROID NODULE: ICD-10-CM

## 2024-06-27 DIAGNOSIS — E03.9 ACQUIRED HYPOTHYROIDISM: ICD-10-CM

## 2024-06-27 PROCEDURE — 3074F SYST BP LT 130 MM HG: CPT | Performed by: STUDENT IN AN ORGANIZED HEALTH CARE EDUCATION/TRAINING PROGRAM

## 2024-06-27 PROCEDURE — 3078F DIAST BP <80 MM HG: CPT | Performed by: STUDENT IN AN ORGANIZED HEALTH CARE EDUCATION/TRAINING PROGRAM

## 2024-06-27 PROCEDURE — 99214 OFFICE O/P EST MOD 30 MIN: CPT | Performed by: STUDENT IN AN ORGANIZED HEALTH CARE EDUCATION/TRAINING PROGRAM

## 2024-06-27 ASSESSMENT — FIBROSIS 4 INDEX: FIB4 SCORE: 0.72

## 2024-06-27 NOTE — LETTER
[facility]  [id number]  [facility] [address]     June 27, 2024    Patient: Ely Rodas   YOB: 1975   Date of Visit: 6/27/2024       To Whom It May Concern:    Ely Rodas was seen and treated in our department on 6/27/2024. Please excuse from work 6/25/2024 through 6/27/2024. She may return to work without restrictions 6/28/2024.    Sincerely,     Myesha Stone M.D.

## 2024-06-27 NOTE — PROGRESS NOTES
"Subjective:     CC: Hypothyroidism, thyroid nodule    HPI:   [facility] presents today with    Problem   Thyroid Nodule    Chronic condition, follows with endocrinology, status post ablation.  She took her last dose of radiation 6/19/2024.  She was told she can return to work 7 days after and provided me with the paperwork.  She does need a note to return to work.     Acquired Hypothyroidism    This is a chronic condition, currently on levothyroxine 125 mcg daily.  She follows with endocrinology.            Objective:     Exam:  BP 90/52 (BP Location: Left arm, Patient Position: Sitting, BP Cuff Size: Large adult)   Pulse 66   Temp 36.4 °C (97.5 °F) (Temporal)   Resp 16   Ht 1.626 m (5' 4\")   Wt 99.7 kg (219 lb 12.8 oz)   SpO2 99%   BMI 37.73 kg/m²  Body mass index is 37.73 kg/m².    Physical Exam  Vitals reviewed.   Constitutional:       General: She is not in acute distress.     Appearance: She is not toxic-appearing.   HENT:      Head: Normocephalic and atraumatic.      Right Ear: External ear normal.      Left Ear: External ear normal.   Eyes:      General:         Right eye: No discharge.         Left eye: No discharge.      Extraocular Movements: Extraocular movements intact.      Conjunctiva/sclera: Conjunctivae normal.   Pulmonary:      Effort: Pulmonary effort is normal. No respiratory distress.   Skin:     General: Skin is warm and dry.   Neurological:      Mental Status: She is alert.   Psychiatric:         Mood and Affect: Mood normal.         Behavior: Behavior normal.         Thought Content: Thought content normal.         Judgment: Judgment normal.           Assessment & Plan:     48 y.o. female with the following -     1. Acquired hypothyroidism  Continue medication at current dosing    2. Thyroid nodule  Status post ablation, her last dose of radiation was 8 days ago.  She is good to return to work and I have provided her a return to work note.      No follow-ups on file.    Please note that this " dictation was created using voice recognition software. I have made every reasonable attempt to correct obvious errors, but I expect that there are errors of grammar and possibly content that I did not discover before finalizing the note.

## 2024-06-30 PROCEDURE — RXMED WILLOW AMBULATORY MEDICATION CHARGE: Performed by: STUDENT IN AN ORGANIZED HEALTH CARE EDUCATION/TRAINING PROGRAM

## 2024-07-01 ENCOUNTER — PHARMACY VISIT (OUTPATIENT)
Dept: PHARMACY | Facility: MEDICAL CENTER | Age: 49
End: 2024-07-01
Payer: COMMERCIAL

## 2024-07-02 ENCOUNTER — HOSPITAL ENCOUNTER (OUTPATIENT)
Dept: LAB | Facility: MEDICAL CENTER | Age: 49
End: 2024-07-02
Attending: INTERNAL MEDICINE
Payer: COMMERCIAL

## 2024-07-02 LAB
CA-I SERPL-SCNC: 1.2 MMOL/L (ref 1.1–1.3)
CALCIUM SERPL-MCNC: 9.7 MG/DL (ref 8.5–10.5)
ESTRADIOL SERPL-MCNC: 22.8 PG/ML
PTH-INTACT SERPL-MCNC: 56.3 PG/ML (ref 14–72)
T3FREE SERPL-MCNC: 3.31 PG/ML (ref 2–4.4)
T4 FREE SERPL-MCNC: 1.59 NG/DL (ref 0.93–1.7)
TSH SERPL DL<=0.005 MIU/L-ACNC: 0.09 UIU/ML (ref 0.38–5.33)

## 2024-07-02 PROCEDURE — 84439 ASSAY OF FREE THYROXINE: CPT

## 2024-07-02 PROCEDURE — 84443 ASSAY THYROID STIM HORMONE: CPT

## 2024-07-02 PROCEDURE — 82310 ASSAY OF CALCIUM: CPT

## 2024-07-02 PROCEDURE — 82330 ASSAY OF CALCIUM: CPT

## 2024-07-02 PROCEDURE — 82652 VIT D 1 25-DIHYDROXY: CPT

## 2024-07-02 PROCEDURE — 83970 ASSAY OF PARATHORMONE: CPT

## 2024-07-02 PROCEDURE — 82670 ASSAY OF TOTAL ESTRADIOL: CPT

## 2024-07-02 PROCEDURE — 84481 FREE ASSAY (FT-3): CPT

## 2024-07-02 PROCEDURE — 36415 COLL VENOUS BLD VENIPUNCTURE: CPT

## 2024-07-04 LAB — 1,25(OH)2D3 SERPL-MCNC: 60.7 PG/ML (ref 19.9–79.3)

## 2024-07-08 ENCOUNTER — HOSPITAL ENCOUNTER (OUTPATIENT)
Dept: RADIOLOGY | Facility: MEDICAL CENTER | Age: 49
End: 2024-07-08
Attending: INTERNAL MEDICINE
Payer: COMMERCIAL

## 2024-07-08 DIAGNOSIS — Z85.850 PERSONAL HISTORY OF MALIGNANT NEOPLASM OF THYROID: ICD-10-CM

## 2024-07-08 PROCEDURE — 78018 THYROID MET IMAGING BODY: CPT

## 2024-07-31 PROCEDURE — RXMED WILLOW AMBULATORY MEDICATION CHARGE: Performed by: STUDENT IN AN ORGANIZED HEALTH CARE EDUCATION/TRAINING PROGRAM

## 2024-07-31 PROCEDURE — RXMED WILLOW AMBULATORY MEDICATION CHARGE: Performed by: INTERNAL MEDICINE

## 2024-08-01 ENCOUNTER — PHARMACY VISIT (OUTPATIENT)
Dept: PHARMACY | Facility: MEDICAL CENTER | Age: 49
End: 2024-08-01
Payer: COMMERCIAL

## 2024-08-08 ENCOUNTER — PATIENT MESSAGE (OUTPATIENT)
Dept: MEDICAL GROUP | Facility: MEDICAL CENTER | Age: 49
End: 2024-08-08
Payer: COMMERCIAL

## 2024-08-08 DIAGNOSIS — K21.9 GASTROESOPHAGEAL REFLUX DISEASE WITHOUT ESOPHAGITIS: ICD-10-CM

## 2024-08-08 DIAGNOSIS — F41.9 ANXIETY: ICD-10-CM

## 2024-08-08 RX ORDER — ALPRAZOLAM 1 MG
1 TABLET ORAL 3 TIMES DAILY PRN
Qty: 90 TABLET | Refills: 2 | Status: CANCELLED | OUTPATIENT
Start: 2024-08-08 | End: 2024-09-07

## 2024-08-08 RX ORDER — PANTOPRAZOLE SODIUM 40 MG/1
40 TABLET, DELAYED RELEASE ORAL DAILY
Qty: 90 TABLET | Refills: 3 | Status: SHIPPED | OUTPATIENT
Start: 2024-08-08

## 2024-08-08 RX ORDER — ALPRAZOLAM 1 MG
1 TABLET ORAL 3 TIMES DAILY PRN
Qty: 90 TABLET | Refills: 2 | Status: SHIPPED | OUTPATIENT
Start: 2024-08-08 | End: 2024-09-29

## 2024-08-08 NOTE — TELEPHONE ENCOUNTER
Patient last seen in office 6/27/24. No future appointment scheduled currently. Do we need to schedule her with one of the other providers for a visit next month since she is on controlled medications?

## 2024-08-29 PROCEDURE — RXMED WILLOW AMBULATORY MEDICATION CHARGE: Performed by: STUDENT IN AN ORGANIZED HEALTH CARE EDUCATION/TRAINING PROGRAM

## 2024-08-30 ENCOUNTER — PHARMACY VISIT (OUTPATIENT)
Dept: PHARMACY | Facility: MEDICAL CENTER | Age: 49
End: 2024-08-30
Payer: COMMERCIAL

## 2024-09-18 PROCEDURE — RXMED WILLOW AMBULATORY MEDICATION CHARGE: Performed by: INTERNAL MEDICINE

## 2024-09-29 ENCOUNTER — PHARMACY VISIT (OUTPATIENT)
Dept: PHARMACY | Facility: MEDICAL CENTER | Age: 49
End: 2024-09-29
Payer: COMMERCIAL

## 2024-09-29 PROCEDURE — RXMED WILLOW AMBULATORY MEDICATION CHARGE: Performed by: STUDENT IN AN ORGANIZED HEALTH CARE EDUCATION/TRAINING PROGRAM

## 2024-09-30 ENCOUNTER — APPOINTMENT (OUTPATIENT)
Dept: OCCUPATIONAL MEDICINE | Facility: CLINIC | Age: 49
End: 2024-09-30

## 2024-09-30 DIAGNOSIS — Z23 NEED FOR VACCINATION: Primary | ICD-10-CM

## 2024-09-30 PROCEDURE — 90656 IIV3 VACC NO PRSV 0.5 ML IM: CPT | Performed by: PREVENTIVE MEDICINE

## 2024-10-04 ENCOUNTER — OFFICE VISIT (OUTPATIENT)
Dept: MEDICAL GROUP | Facility: MEDICAL CENTER | Age: 49
End: 2024-10-04
Payer: COMMERCIAL

## 2024-10-04 VITALS
BODY MASS INDEX: 38.41 KG/M2 | SYSTOLIC BLOOD PRESSURE: 90 MMHG | WEIGHT: 225 LBS | HEART RATE: 65 BPM | DIASTOLIC BLOOD PRESSURE: 60 MMHG | TEMPERATURE: 96.7 F | OXYGEN SATURATION: 100 % | HEIGHT: 64 IN

## 2024-10-04 DIAGNOSIS — G89.29 CHRONIC HEEL PAIN, LEFT: ICD-10-CM

## 2024-10-04 DIAGNOSIS — E03.9 ACQUIRED HYPOTHYROIDISM: ICD-10-CM

## 2024-10-04 DIAGNOSIS — M79.672 CHRONIC HEEL PAIN, LEFT: ICD-10-CM

## 2024-10-04 DIAGNOSIS — Z12.11 COLON CANCER SCREENING: ICD-10-CM

## 2024-10-04 PROCEDURE — 99214 OFFICE O/P EST MOD 30 MIN: CPT | Performed by: PHYSICIAN ASSISTANT

## 2024-10-04 PROCEDURE — 3074F SYST BP LT 130 MM HG: CPT | Performed by: PHYSICIAN ASSISTANT

## 2024-10-04 PROCEDURE — 3078F DIAST BP <80 MM HG: CPT | Performed by: PHYSICIAN ASSISTANT

## 2024-10-04 PROCEDURE — RXMED WILLOW AMBULATORY MEDICATION CHARGE: Performed by: PHYSICIAN ASSISTANT

## 2024-10-04 RX ORDER — IBUPROFEN 600 MG/1
600 TABLET, FILM COATED ORAL EVERY 8 HOURS PRN
Qty: 90 TABLET | Refills: 1 | Status: SHIPPED | OUTPATIENT
Start: 2024-10-04 | End: 2024-11-05

## 2024-10-04 ASSESSMENT — FIBROSIS 4 INDEX: FIB4 SCORE: 0.74

## 2024-10-06 ENCOUNTER — PHARMACY VISIT (OUTPATIENT)
Dept: PHARMACY | Facility: MEDICAL CENTER | Age: 49
End: 2024-10-06
Payer: COMMERCIAL

## 2024-10-17 LAB — NONINV COLON CA DNA+OCC BLD SCRN STL QL: NEGATIVE

## 2024-10-30 ENCOUNTER — PHARMACY VISIT (OUTPATIENT)
Dept: PHARMACY | Facility: MEDICAL CENTER | Age: 49
End: 2024-10-30
Payer: COMMERCIAL

## 2024-10-30 PROCEDURE — RXMED WILLOW AMBULATORY MEDICATION CHARGE: Performed by: STUDENT IN AN ORGANIZED HEALTH CARE EDUCATION/TRAINING PROGRAM

## 2024-11-12 ENCOUNTER — OFFICE VISIT (OUTPATIENT)
Dept: MEDICAL GROUP | Facility: MEDICAL CENTER | Age: 49
End: 2024-11-12
Payer: COMMERCIAL

## 2024-11-12 VITALS
WEIGHT: 235.8 LBS | BODY MASS INDEX: 40.26 KG/M2 | TEMPERATURE: 96.8 F | DIASTOLIC BLOOD PRESSURE: 66 MMHG | OXYGEN SATURATION: 98 % | SYSTOLIC BLOOD PRESSURE: 102 MMHG | HEART RATE: 79 BPM | HEIGHT: 64 IN

## 2024-11-12 DIAGNOSIS — F41.1 GAD (GENERALIZED ANXIETY DISORDER): ICD-10-CM

## 2024-11-12 DIAGNOSIS — Z00.00 PREVENTATIVE HEALTH CARE: ICD-10-CM

## 2024-11-12 PROCEDURE — 99214 OFFICE O/P EST MOD 30 MIN: CPT | Performed by: PHYSICIAN ASSISTANT

## 2024-11-12 PROCEDURE — 3074F SYST BP LT 130 MM HG: CPT | Performed by: PHYSICIAN ASSISTANT

## 2024-11-12 PROCEDURE — 3078F DIAST BP <80 MM HG: CPT | Performed by: PHYSICIAN ASSISTANT

## 2024-11-12 RX ORDER — ALPRAZOLAM 1 MG/1
1 TABLET ORAL
Qty: 90 TABLET | Refills: 2 | Status: SHIPPED | OUTPATIENT
Start: 2024-11-12 | End: 2024-11-12 | Stop reason: SDUPTHER

## 2024-11-12 RX ORDER — ALPRAZOLAM 1 MG/1
1 TABLET ORAL
Qty: 90 TABLET | Refills: 2 | Status: SHIPPED | OUTPATIENT
Start: 2024-11-29 | End: 2024-12-29

## 2024-11-12 ASSESSMENT — FIBROSIS 4 INDEX: FIB4 SCORE: 0.74

## 2024-11-12 NOTE — PROGRESS NOTES
Subjective:     History of Present Illness  The patient presents for medication renewal.    She is currently on Xanax 1 mg, taken three times daily, and is seeking a renewal of this prescription. She has been on this regimen for approximately six months. Prior to this, she was on a higher dose of 2 mg, which was subsequently reduced. She has previously tried Celexa and has participated in group therapy sessions.  She states that her PCP will return in January where she will have a follow-up appointment to discuss further reduction in her benzodiazepine.      Current medicines (including changes today)  Current Outpatient Medications   Medication Sig Dispense Refill    [START ON 11/29/2024] ALPRAZolam (XANAX) 1 MG Tab Take 1 Tablet by mouth 3 times a day for 30 days. Indications: Feeling Anxious 90 Tablet 2    pantoprazole (PROTONIX) 40 MG Tablet Delayed Response Take 1 Tablet by mouth every day. 90 Tablet 3    ergocalciferol (DRISDOL) 88525 UNIT capsule Take 1 capsule Orally Every 7 days for 84 days 12 Capsule 3    Estradiol 0.025 MG/24HR PATCH BIWEEKLY Apply 1 patch to skin Transdermal every 3 days for 30 days 8 Patch 3    progesterone (PROMETRIUM) 100 MG Cap Take 1 capsule by mouth at bedtime once a day 90 Capsule 3    Calcium Carbonate Antacid (TUMS ULTRA 1000) 1000 MG Chew Tab Chew 2 Tablets 3 times a day with meals. (Patient taking differently: Chew 2,000 mg every day. Pt takes QDAY, not TID with meals) 90 Tablet 3    levothyroxine (SYNTHROID) 125 MCG Tab Take 1 tablet every day by oral route. 30 Tablet 2    Estradiol 0.025 MG/24HR PATCH BIWEEKLY Apply 1 patch to skin Transdermal every 3 days (Patient taking differently: Apply 1 Patch topically see administration instructions. Pt changes twice a week, no set days.  Pt took off 2 days ago (5/6/2024)) 10 Patch 3    progesterone (PROMETRIUM) 100 MG Cap Take 1 Capsule by mouth every day at bedtime (Patient taking differently: Take 100 mg by mouth every evening.) 90  Capsule 3    ergocalciferol (DRISDOL) 12319 UNIT capsule Take 1 capsule by mouth every 7 days (Patient taking differently: Take 50,000 Units by mouth every 7 days. On Wed) 12 Capsule 3    albuterol 108 (90 Base) MCG/ACT Aero Soln inhalation aerosol Inhale 2 Puffs every 6 hours as needed for Shortness of Breath.       No current facility-administered medications for this visit.     She  has a past medical history of Anxiety disorder (03/11/2024), Arthritis, BRCA2 negative, Bronchitis (), Depression (03/11/2024), Diabetes (HCC) (03/11/2024), Heart burn (2000), Hypothyroid, Pain (03/11/2024), and Psychiatric problem (1994).    She has no past medical history of Acute nasopharyngitis, Anesthesia, Anginal syndrome ([facility]), Arrhythmia, Asthma, Awareness under anesthesia, Blood clotting disorder ([facility]), Bowel habit changes, Breath shortness, Cancer ([facility]), Carcinoma in situ of respiratory system, Cataract, Congestive heart failure ([facility]), Continuous ambulatory peritoneal dialysis status ([facility]), Coughing blood, Delayed emergence from general anesthesia, Dental disorder, Dialysis patient ([facility]), Emphysema of lung ([facility]), Glaucoma, Gynecological disorder, Hard to intubate, Heart murmur, Heart valve disease, Hemorrhagic disorder ([facility]), Hepatitis A, Hepatitis B, Hepatitis C, Hiatus hernia syndrome, High cholesterol, Hypertension, Indigestion, Infectious disease, Jaundice, Malignant hyperthermia, Myocardial infarct ([facility]), Pacemaker, Pneumonia, PONV (postoperative nausea and vomiting), Pregnant, Renal disorder, Rheumatic fever, Seizure ([facility]), Sleep apnea, Snoring, Spinal headache, Stroke ([facility]), Tuberculosis, Urinary bladder disorder, or Urinary incontinence.    ROS   No chest pain, no shortness of breath, no abdominal pain  Positive ROS as per HPI.  All other systems reviewed and are negative.     Objective:     /66 (BP Location: Left arm, Patient Position: Sitting, BP Cuff Size: Large adult)   Pulse 79   Temp 36 °C (96.8  "°F) (Temporal)   Ht 1.626 m (5' 4\")   Wt 107 kg (235 lb 12.8 oz)   SpO2 98%  Body mass index is 40.47 kg/m².   Physical Exam  Vital Signs  BMI is 40.  Constitutional: Alert, no distress.  Skin: Warm, dry, good turgor, no rashes in visible areas.  Eye: Equal, round and reactive, conjunctiva clear, lids normal.  ENMT: Lips without lesions, good dentition, oropharynx clear.  Neck: Trachea midline, no masses, no thyromegaly.  Psych: Alert and oriented x3, normal affect and mood.      Results          Assessment and Plan:   The following treatment plan was discussed    Assessment & Plan  1. Anxiety.  Chronic condition.  Stable.   reviewed.  Urine drug screen was deferred until January.  Agreement is up-to-date.  A prescription for Xanax 1 mg, to be taken three times daily, has been renewed with two refills. A referral to a psychiatrist has been discussed to consider weaning off or reducing the dosage. The patient has previously tried Celexa and attended therapy sessions. If interested, she can reach out to psychiatry before her next appointment with Dr. Lopez in January.  Otherwise follow-up with her PCP to discuss further reduction in the medication.    2.  Morbid obesity.  Chronic condition.  Her BMI is 40. Weight loss options, including once-a-week injectables like Wegovy or Zepbound, were discussed. She is advised to contact her insurance to check for coverage of these medications. Lifestyle modifications, including diet and exercise, were also recommended.    3. Prediabetes.  Chronic condition. Her A1c was 5.9 eight months ago, and previously 5.7. She has been advised to continue dietary modifications, including reducing sugar and starch intake. An A1c test has been ordered to monitor her progress.    5. Health Maintenance.  A cholesterol profile has been ordered as part of her preventive care. She is advised to fast for 8 hours before the test.        ORDERS:  1. AMBIKA (generalized anxiety disorder)    - " ALPRAZolam (XANAX) 1 MG Tab; Take 1 Tablet by mouth 3 times a day for 30 days. Indications: Feeling Anxious  Dispense: 90 Tablet; Refill: 2    2. Preventative health care    - HEMOGLOBIN A1C; Future  - Lipid Profile; Future        Please note that this dictation was created using voice recognition software. I have made every reasonable attempt to correct obvious errors, but I expect that there are errors of grammar and possibly content that I did not discover before finalizing the note.      Attestation      Verbal consent was acquired by the patient to use [employer] ambient listening note generation during this visit No

## 2024-11-13 ENCOUNTER — HOSPITAL ENCOUNTER (OUTPATIENT)
Dept: LAB | Facility: MEDICAL CENTER | Age: 49
End: 2024-11-13
Attending: PHYSICIAN ASSISTANT
Payer: COMMERCIAL

## 2024-11-13 DIAGNOSIS — Z00.00 PREVENTATIVE HEALTH CARE: ICD-10-CM

## 2024-11-13 LAB
CHOLEST SERPL-MCNC: 204 MG/DL (ref 100–199)
EST. AVERAGE GLUCOSE BLD GHB EST-MCNC: 120 MG/DL
HBA1C MFR BLD: 5.8 % (ref 4–5.6)
HDLC SERPL-MCNC: 64 MG/DL
LDLC SERPL CALC-MCNC: 118 MG/DL
TRIGL SERPL-MCNC: 112 MG/DL (ref 0–149)

## 2024-11-13 PROCEDURE — 83036 HEMOGLOBIN GLYCOSYLATED A1C: CPT

## 2024-11-13 PROCEDURE — 80061 LIPID PANEL: CPT

## 2024-11-13 PROCEDURE — 36415 COLL VENOUS BLD VENIPUNCTURE: CPT

## 2024-11-29 PROCEDURE — RXMED WILLOW AMBULATORY MEDICATION CHARGE: Performed by: PHYSICIAN ASSISTANT

## 2024-11-29 PROCEDURE — RXMED WILLOW AMBULATORY MEDICATION CHARGE: Performed by: INTERNAL MEDICINE

## 2024-12-01 ENCOUNTER — PHARMACY VISIT (OUTPATIENT)
Dept: PHARMACY | Facility: MEDICAL CENTER | Age: 49
End: 2024-12-01
Payer: COMMERCIAL

## 2024-12-30 PROCEDURE — RXMED WILLOW AMBULATORY MEDICATION CHARGE: Performed by: PHYSICIAN ASSISTANT

## 2024-12-30 PROCEDURE — RXMED WILLOW AMBULATORY MEDICATION CHARGE: Performed by: INTERNAL MEDICINE

## 2024-12-31 ENCOUNTER — PHARMACY VISIT (OUTPATIENT)
Dept: PHARMACY | Facility: MEDICAL CENTER | Age: 49
End: 2024-12-31
Payer: COMMERCIAL

## 2025-01-10 ENCOUNTER — APPOINTMENT (OUTPATIENT)
Dept: MEDICAL GROUP | Facility: MEDICAL CENTER | Age: 50
End: 2025-01-10
Payer: COMMERCIAL

## 2025-01-10 ENCOUNTER — OFFICE VISIT (OUTPATIENT)
Dept: DERMATOLOGY | Facility: IMAGING CENTER | Age: 50
End: 2025-01-10
Payer: COMMERCIAL

## 2025-01-10 ENCOUNTER — HOSPITAL ENCOUNTER (OUTPATIENT)
Facility: MEDICAL CENTER | Age: 50
End: 2025-01-10
Attending: STUDENT IN AN ORGANIZED HEALTH CARE EDUCATION/TRAINING PROGRAM
Payer: COMMERCIAL

## 2025-01-10 VITALS
DIASTOLIC BLOOD PRESSURE: 76 MMHG | TEMPERATURE: 96.7 F | OXYGEN SATURATION: 100 % | HEART RATE: 70 BPM | SYSTOLIC BLOOD PRESSURE: 120 MMHG | BODY MASS INDEX: 41.48 KG/M2 | WEIGHT: 243 LBS | HEIGHT: 64 IN

## 2025-01-10 DIAGNOSIS — Z79.899 HIGH RISK MEDICATION USE: ICD-10-CM

## 2025-01-10 DIAGNOSIS — E04.1 THYROID NODULE: ICD-10-CM

## 2025-01-10 DIAGNOSIS — E78.2 MIXED HYPERLIPIDEMIA: ICD-10-CM

## 2025-01-10 DIAGNOSIS — L82.1 DERMATOSIS PAPULOSA NIGRA: ICD-10-CM

## 2025-01-10 DIAGNOSIS — E03.9 ACQUIRED HYPOTHYROIDISM: ICD-10-CM

## 2025-01-10 DIAGNOSIS — E66.01 CLASS 3 SEVERE OBESITY DUE TO EXCESS CALORIES WITH SERIOUS COMORBIDITY AND BODY MASS INDEX (BMI) OF 40.0 TO 44.9 IN ADULT (HCC): ICD-10-CM

## 2025-01-10 DIAGNOSIS — F41.1 GAD (GENERALIZED ANXIETY DISORDER): ICD-10-CM

## 2025-01-10 DIAGNOSIS — R73.03 PREDIABETES: ICD-10-CM

## 2025-01-10 DIAGNOSIS — L21.9 SEBORRHEA: ICD-10-CM

## 2025-01-10 DIAGNOSIS — E66.813 CLASS 3 SEVERE OBESITY DUE TO EXCESS CALORIES WITH SERIOUS COMORBIDITY AND BODY MASS INDEX (BMI) OF 40.0 TO 44.9 IN ADULT (HCC): ICD-10-CM

## 2025-01-10 DIAGNOSIS — L82.1 SEBORRHEIC KERATOSIS: ICD-10-CM

## 2025-01-10 PROCEDURE — G0480 DRUG TEST DEF 1-7 CLASSES: HCPCS

## 2025-01-10 PROCEDURE — 99204 OFFICE O/P NEW MOD 45 MIN: CPT | Performed by: DERMATOLOGY

## 2025-01-10 PROCEDURE — 99214 OFFICE O/P EST MOD 30 MIN: CPT | Performed by: STUDENT IN AN ORGANIZED HEALTH CARE EDUCATION/TRAINING PROGRAM

## 2025-01-10 PROCEDURE — 3074F SYST BP LT 130 MM HG: CPT | Performed by: STUDENT IN AN ORGANIZED HEALTH CARE EDUCATION/TRAINING PROGRAM

## 2025-01-10 PROCEDURE — 3078F DIAST BP <80 MM HG: CPT | Performed by: STUDENT IN AN ORGANIZED HEALTH CARE EDUCATION/TRAINING PROGRAM

## 2025-01-10 PROCEDURE — 80307 DRUG TEST PRSMV CHEM ANLYZR: CPT

## 2025-01-10 RX ORDER — ALPRAZOLAM 1 MG/1
1 TABLET ORAL
Qty: 90 TABLET | Refills: 2 | Status: SHIPPED | OUTPATIENT
Start: 2025-01-10 | End: 2025-04-10

## 2025-01-10 RX ORDER — CLOBETASOL PROPIONATE 0.5 MG/ML
SOLUTION TOPICAL
Qty: 50 ML | Refills: 2 | Status: SHIPPED | OUTPATIENT
Start: 2025-01-10

## 2025-01-10 RX ORDER — TIRZEPATIDE 2.5 MG/.5ML
2.5 INJECTION, SOLUTION SUBCUTANEOUS
Qty: 2 ML | Refills: 0 | Status: SHIPPED | OUTPATIENT
Start: 2025-01-10

## 2025-01-10 ASSESSMENT — PATIENT HEALTH QUESTIONNAIRE - PHQ9: CLINICAL INTERPRETATION OF PHQ2 SCORE: 0

## 2025-01-10 ASSESSMENT — FIBROSIS 4 INDEX: FIB4 SCORE: 0.74

## 2025-01-10 NOTE — PROGRESS NOTES
CC: Establish Care and Other (Scalp issues.)       Subjective: New patient here for scalp issues, pt states it feel like sore itchy and flaky.   Also has a mole of concern on Nose    HPI/location: nose  Time present: 5 years  Painful lesion: No  Itching lesion: Yes  Enlarging lesion: Yes  Anything make it better or worse?     History of skin cancer: No  History of precancers/actinic keratoses: No  History of biopsies:No  History of blistering/severe sunburns:No  Family history of skin cancer:No  Family history of atypical moles:No    ROS: no fevers/chills. No itch.  No cough  Relevant PMH: hypothyroidism  Social: NS    PE: Gen:WDWN female in NAD. Skin: scant scaling on scalp. Hair in tight lulu. Face - scattered waxy papules, hyperpigmented papule on forehead and plaque on nose, appearing benign    A/P:  seborrhea,scalp: chronic flared-mild  -counseled re: dx/tx  -antidandruff shampoos - use with hair washing  -clobetasol solution Qday-BID--> PRN, se reviewed    DPN, face:  -reviewed familial    SK, likely, nose: chronic  -reviewed LN2 R/B/A  -to trial OTC compound W and f/u PRN    F/u 1 year/PRN    I have reviewed medications relevant to my specialty.

## 2025-01-10 NOTE — PROGRESS NOTES
"Subjective:     CC: Hyperlipidemia, prediabetes, thyroid concern,anxiety    HPI:   Ely presents today with    Problem   Mixed Hyperlipidemia    Chronic condition, worsening    Lab Results   Component Value Date/Time    CHOLSTRLTOT 204 (H) 11/13/2024 08:04 AM     (H) 11/13/2024 08:04 AM    HDL 64 11/13/2024 08:04 AM    TRIGLYCERIDE 112 11/13/2024 08:04 AM       Lab Results   Component Value Date/Time    SODIUM 138 05/27/2024 09:16 AM    POTASSIUM 4.2 05/27/2024 09:16 AM    CHLORIDE 102 05/27/2024 09:16 AM    CO2 22 05/27/2024 09:16 AM    GLUCOSE 108 (H) 05/27/2024 09:16 AM    BUN 9 05/27/2024 09:16 AM    CREATININE 0.69 05/27/2024 09:16 AM     Lab Results   Component Value Date/Time    ALKPHOSPHAT 117 (H) 05/27/2024 09:16 AM    ASTSGOT 23 05/27/2024 09:16 AM    ALTSGPT 23 05/27/2024 09:16 AM    TBILIRUBIN 0.3 05/27/2024 09:16 AM           Prediabetes    Chronic condition, stable     High Risk Medication Use   Class 3 Severe Obesity Due to Excess Calories With Serious Comorbidity and Body Mass Index (Bmi) of 40.0 to 44.9 in Adult (Hcc)    This is a chronic condition, worsening with comorbid hyperlipidemia and prediabetes.  She has been working on healthy diet and exercise but has still continued to gain weight.     Thyroid Nodule    Chronic condition, follows with endocrinology, status post ablation.  She took her last dose of radiation 6/19/2024.      Allen (Generalized Anxiety Disorder)    This is a chronic condition.  She is doing well with Xanax 1 mg 3 times daily.  PDMP reviewed, controlled substance agreement on file, urine drug screen on file      Acquired Hypothyroidism    This is a chronic condition, currently on levothyroxine 125 mcg daily.  She follows with endocrinology.         ROS:  ROS    Objective:     Exam:  /76 (BP Location: Left arm, Patient Position: Sitting, BP Cuff Size: Large adult)   Pulse 70   Temp 35.9 °C (96.7 °F) (Temporal)   Ht 1.626 m (5' 4\")   Wt 110 kg (243 lb)   SpO2 " 100%   BMI 41.71 kg/m²  Body mass index is 41.71 kg/m².    Physical Exam  Vitals reviewed.   Constitutional:       General: She is not in acute distress.     Appearance: She is not toxic-appearing.   HENT:      Head: Normocephalic and atraumatic.      Right Ear: External ear normal.      Left Ear: External ear normal.   Eyes:      General:         Right eye: No discharge.         Left eye: No discharge.      Extraocular Movements: Extraocular movements intact.      Conjunctiva/sclera: Conjunctivae normal.   Cardiovascular:      Rate and Rhythm: Normal rate and regular rhythm.      Heart sounds: Normal heart sounds. No murmur heard.  Pulmonary:      Effort: Pulmonary effort is normal. No respiratory distress.      Breath sounds: Normal breath sounds. No wheezing or rales.   Skin:     General: Skin is warm and dry.   Neurological:      Mental Status: She is alert.   Psychiatric:         Mood and Affect: Mood normal.         Behavior: Behavior normal.         Thought Content: Thought content normal.         Judgment: Judgment normal.           Assessment & Plan:     49 y.o. female with the following -     1. Thyroid nodule  2. Acquired hypothyroidism  Recheck labs, referral to endocrinology sent  - TSH; Future  - TRIIDOTHYRONINE; Future  - FREE THYROXINE; Future  - Referral to Endocrinology    3. Mixed hyperlipidemia  4. Prediabetes  5. Class 3 severe obesity due to excess calories with serious comorbidity and body mass index (BMI) of 40.0 to 44.9 in adult (HCC)  Comorbid hyperlipidemia and prediabetes and increasing BMI.  Discussed the risks and benefits of Zepbound  - Tirzepatide-Weight Management (ZEPBOUND) 2.5 MG/0.5ML Solution Auto-injector; Inject 2.5 mg under the skin every 7 days.  Dispense: 2 mL; Refill: 0  - TSH; Future  - TRIIDOTHYRONINE; Future  - FREE THYROXINE; Future    6. AMBIKA (generalized anxiety disorder)  - Controlled Substance Treatment Agreement  - ALPRAZolam (XANAX) 1 MG Tab; Take 1 Tablet by  mouth 3 times a day for 90 days. Indications: Feeling Anxious  Dispense: 90 Tablet; Refill: 2    7. High risk medication use  - Controlled Substance Treatment Agreement  - URINE DRUG SCREEN W/CONF (AR); Future      No follow-ups on file.    Please note that this dictation was created using voice recognition software. I have made every reasonable attempt to correct obvious errors, but I expect that there are errors of grammar and possibly content that I did not discover before finalizing the note.

## 2025-01-12 LAB
AMPHET CTO UR CFM-MCNC: NEGATIVE NG/ML
BARBITURATES CTO UR CFM-MCNC: NEGATIVE NG/ML
BENZODIAZ CTO UR CFM-MCNC: NORMAL NG/ML
CANNABINOIDS CTO UR CFM-MCNC: NEGATIVE NG/ML
COCAINE CTO UR CFM-MCNC: NEGATIVE NG/ML
CREAT UR-MCNC: 183.2 MG/DL (ref 20–400)
DRUG COMMENT 753798: NORMAL
METHADONE CTO UR CFM-MCNC: NEGATIVE NG/ML
OPIATES CTO UR CFM-MCNC: NEGATIVE NG/ML
PCP CTO UR CFM-MCNC: NEGATIVE NG/ML
PROPOXYPH CTO UR CFM-MCNC: NEGATIVE NG/ML

## 2025-01-13 ENCOUNTER — TELEPHONE (OUTPATIENT)
Dept: MEDICAL GROUP | Facility: MEDICAL CENTER | Age: 50
End: 2025-01-13
Payer: COMMERCIAL

## 2025-01-17 LAB
1OH-MIDAZOLAM UR CFM-MCNC: <20 NG/ML
7AMINOCLONAZEPAM UR CFM-MCNC: <5 NG/ML
A-OH ALPRAZ UR CFM-MCNC: 250 NG/ML
ALPRAZ UR CFM-MCNC: 103 NG/ML
CHLORDIAZEP UR CFM-MCNC: <20 NG/ML
CLONAZEPAM UR CFM-MCNC: <5 NG/ML
DIAZEPAM UR CFM-MCNC: <20 NG/ML
LORAZEPAM UR CFM-MCNC: <20 NG/ML
MIDAZOLAM UR CFM-MCNC: <20 NG/ML
NORDIAZEPAM UR CFM-MCNC: <20 NG/ML
OXAZEPAM UR CFM-MCNC: <20 NG/ML
TEMAZEPAM UR CFM-MCNC: <20 NG/ML

## 2025-01-17 PROCEDURE — G0480 DRUG TEST DEF 1-7 CLASSES: HCPCS

## 2025-01-21 ENCOUNTER — HOSPITAL ENCOUNTER (OUTPATIENT)
Dept: LAB | Facility: MEDICAL CENTER | Age: 50
End: 2025-01-21
Attending: STUDENT IN AN ORGANIZED HEALTH CARE EDUCATION/TRAINING PROGRAM
Payer: COMMERCIAL

## 2025-01-21 DIAGNOSIS — E04.1 THYROID NODULE: ICD-10-CM

## 2025-01-21 DIAGNOSIS — E03.9 ACQUIRED HYPOTHYROIDISM: ICD-10-CM

## 2025-01-21 DIAGNOSIS — E66.813 CLASS 3 SEVERE OBESITY DUE TO EXCESS CALORIES WITH SERIOUS COMORBIDITY AND BODY MASS INDEX (BMI) OF 40.0 TO 44.9 IN ADULT (HCC): ICD-10-CM

## 2025-01-21 DIAGNOSIS — E66.01 CLASS 3 SEVERE OBESITY DUE TO EXCESS CALORIES WITH SERIOUS COMORBIDITY AND BODY MASS INDEX (BMI) OF 40.0 TO 44.9 IN ADULT (HCC): ICD-10-CM

## 2025-01-21 LAB
T3 SERPL-MCNC: 125 NG/DL (ref 60–181)
T4 FREE SERPL-MCNC: 1.58 NG/DL (ref 0.93–1.7)
TSH SERPL-ACNC: 0.71 UIU/ML (ref 0.35–5.5)

## 2025-01-21 PROCEDURE — 36415 COLL VENOUS BLD VENIPUNCTURE: CPT

## 2025-01-21 PROCEDURE — 84439 ASSAY OF FREE THYROXINE: CPT

## 2025-01-21 PROCEDURE — 84480 ASSAY TRIIODOTHYRONINE (T3): CPT

## 2025-01-21 PROCEDURE — 84443 ASSAY THYROID STIM HORMONE: CPT

## 2025-01-22 PROCEDURE — RXMED WILLOW AMBULATORY MEDICATION CHARGE: Performed by: INTERNAL MEDICINE

## 2025-01-23 ENCOUNTER — PHARMACY VISIT (OUTPATIENT)
Dept: PHARMACY | Facility: MEDICAL CENTER | Age: 50
End: 2025-01-23
Payer: COMMERCIAL

## 2025-01-23 PROCEDURE — RXMED WILLOW AMBULATORY MEDICATION CHARGE: Performed by: DERMATOLOGY

## 2025-01-26 ENCOUNTER — PHARMACY VISIT (OUTPATIENT)
Dept: PHARMACY | Facility: MEDICAL CENTER | Age: 50
End: 2025-01-26
Payer: COMMERCIAL

## 2025-01-27 ENCOUNTER — HOSPITAL ENCOUNTER (OUTPATIENT)
Dept: LAB | Facility: MEDICAL CENTER | Age: 50
End: 2025-01-27
Attending: INTERNAL MEDICINE
Payer: COMMERCIAL

## 2025-01-27 LAB
25(OH)D3 SERPL-MCNC: 32 NG/ML (ref 30–100)
CA-I SERPL-SCNC: 1.2 MMOL/L (ref 1.1–1.3)
PTH-INTACT SERPL-MCNC: 51.9 PG/ML (ref 14–72)
T3FREE SERPL-MCNC: 3.18 PG/ML (ref 2–4.4)
T4 FREE SERPL-MCNC: 1.78 NG/DL (ref 0.93–1.7)
TSH SERPL-ACNC: 0.69 UIU/ML (ref 0.35–5.5)

## 2025-01-27 PROCEDURE — 82330 ASSAY OF CALCIUM: CPT

## 2025-01-27 PROCEDURE — 84439 ASSAY OF FREE THYROXINE: CPT

## 2025-01-27 PROCEDURE — 84443 ASSAY THYROID STIM HORMONE: CPT

## 2025-01-27 PROCEDURE — 36415 COLL VENOUS BLD VENIPUNCTURE: CPT

## 2025-01-27 PROCEDURE — 84481 FREE ASSAY (FT-3): CPT

## 2025-01-27 PROCEDURE — 83970 ASSAY OF PARATHORMONE: CPT

## 2025-01-27 PROCEDURE — 82306 VITAMIN D 25 HYDROXY: CPT

## 2025-02-01 PROCEDURE — RXMED WILLOW AMBULATORY MEDICATION CHARGE: Performed by: STUDENT IN AN ORGANIZED HEALTH CARE EDUCATION/TRAINING PROGRAM

## 2025-02-02 ENCOUNTER — PHARMACY VISIT (OUTPATIENT)
Dept: PHARMACY | Facility: MEDICAL CENTER | Age: 50
End: 2025-02-02
Payer: COMMERCIAL

## 2025-02-03 ENCOUNTER — OFFICE VISIT (OUTPATIENT)
Dept: MEDICAL GROUP | Facility: MEDICAL CENTER | Age: 50
End: 2025-02-03
Payer: COMMERCIAL

## 2025-02-03 VITALS
SYSTOLIC BLOOD PRESSURE: 116 MMHG | HEIGHT: 65 IN | WEIGHT: 238.8 LBS | BODY MASS INDEX: 39.79 KG/M2 | HEART RATE: 69 BPM | OXYGEN SATURATION: 100 % | DIASTOLIC BLOOD PRESSURE: 72 MMHG | TEMPERATURE: 97.2 F

## 2025-02-03 DIAGNOSIS — R39.89 URINE DISCOLORATION: ICD-10-CM

## 2025-02-03 DIAGNOSIS — M54.42 ACUTE LEFT-SIDED LOW BACK PAIN WITH LEFT-SIDED SCIATICA: ICD-10-CM

## 2025-02-03 LAB
APPEARANCE UR: NORMAL
BILIRUB UR STRIP-MCNC: NEGATIVE MG/DL
COLOR UR AUTO: NORMAL
GLUCOSE UR STRIP.AUTO-MCNC: NEGATIVE MG/DL
KETONES UR STRIP.AUTO-MCNC: NEGATIVE MG/DL
LEUKOCYTE ESTERASE UR QL STRIP.AUTO: NEGATIVE
NITRITE UR QL STRIP.AUTO: NEGATIVE
PH UR STRIP.AUTO: 5.5 [PH] (ref 5–8)
PROT UR QL STRIP: NORMAL MG/DL
RBC UR QL AUTO: NEGATIVE
SP GR UR STRIP.AUTO: 1.03
UROBILINOGEN UR STRIP-MCNC: 0.2 MG/DL

## 2025-02-03 PROCEDURE — 99213 OFFICE O/P EST LOW 20 MIN: CPT | Performed by: FAMILY MEDICINE

## 2025-02-03 PROCEDURE — 3074F SYST BP LT 130 MM HG: CPT | Performed by: FAMILY MEDICINE

## 2025-02-03 PROCEDURE — RXMED WILLOW AMBULATORY MEDICATION CHARGE: Performed by: FAMILY MEDICINE

## 2025-02-03 PROCEDURE — 81002 URINALYSIS NONAUTO W/O SCOPE: CPT | Performed by: FAMILY MEDICINE

## 2025-02-03 PROCEDURE — 3078F DIAST BP <80 MM HG: CPT | Performed by: FAMILY MEDICINE

## 2025-02-03 RX ORDER — CYCLOBENZAPRINE HCL 5 MG
5-10 TABLET ORAL 3 TIMES DAILY PRN
Qty: 90 TABLET | Refills: 3 | Status: SHIPPED | OUTPATIENT
Start: 2025-02-03 | End: 2025-02-03

## 2025-02-03 RX ORDER — PREDNISONE 20 MG/1
TABLET ORAL
Qty: 11 TABLET | Refills: 0 | Status: SHIPPED | OUTPATIENT
Start: 2025-02-03 | End: 2025-02-03

## 2025-02-03 RX ORDER — PREDNISONE 20 MG/1
TABLET ORAL
Qty: 11 TABLET | Refills: 0 | Status: SHIPPED | OUTPATIENT
Start: 2025-02-03 | End: 2025-02-11

## 2025-02-03 RX ORDER — CYCLOBENZAPRINE HCL 5 MG
5-10 TABLET ORAL 3 TIMES DAILY PRN
Qty: 90 TABLET | Refills: 3 | Status: SHIPPED | OUTPATIENT
Start: 2025-02-03

## 2025-02-03 ASSESSMENT — FIBROSIS 4 INDEX: FIB4 SCORE: 0.74

## 2025-02-04 ENCOUNTER — APPOINTMENT (OUTPATIENT)
Dept: RADIOLOGY | Facility: MEDICAL CENTER | Age: 50
End: 2025-02-04
Attending: FAMILY MEDICINE
Payer: COMMERCIAL

## 2025-02-04 ENCOUNTER — PHARMACY VISIT (OUTPATIENT)
Dept: PHARMACY | Facility: MEDICAL CENTER | Age: 50
End: 2025-02-04
Payer: COMMERCIAL

## 2025-02-04 DIAGNOSIS — R39.89 URINE DISCOLORATION: ICD-10-CM

## 2025-02-04 PROCEDURE — 76775 US EXAM ABDO BACK WALL LIM: CPT

## 2025-02-04 NOTE — PROGRESS NOTES
Verbal consent was acquired by the patient to use [employer] ambient listening note generation during this visit:  Yes      Chief complaint::Diagnoses of Urine discoloration and Acute left-sided low back pain with left-sided sciatica were pertinent to this visit.    Assessment and Plan:   The following treatment plan was discussed:     Assessment & Plan  1.  Acute left flank/ left lower pelvic pain: Potentially musculoskeletal in nature versus kidney.  - Ordered stat renal ultrasound to rule out renal calculus.  - Prescribed Flexeril 10 mg for muscle-related pain.  - Prescribed steroids for possible sciatic nerve involvement.    2.  Acute discolored urine.  - Urinalysis today to check for infection or blood: Increased urine concentration otherwise negative urinalysis  - Possible dietary factors or kidney stone.    Follow-up  - Appointment scheduled for tomorrow for renal ultrasound    PROCEDURE  Thyroid surgery in April or May.  [facility] was seen today for other.    Diagnoses and all orders for this visit:    Urine discoloration  -     POCT Urinalysis  -     Cancel: US-RENAL; Future  -     US-RENAL; Future    Acute left-sided low back pain with left-sided sciatica  -     Discontinue: cyclobenzaprine (FLEXERIL) 5 mg tablet; Take 1-2 Tablets by mouth 3 times a day as needed for Muscle Spasms or Moderate Pain.  -     Discontinue: predniSONE (DELTASONE) 20 MG Tab; Take 2 Tablets by mouth every day for 4 days, THEN 1 Tablet every day for 3 days.  -     predniSONE (DELTASONE) 20 MG Tab; Take 2 Tablets by mouth every day for 4 days, THEN 1 Tablet every day for 3 days.  -     cyclobenzaprine (FLEXERIL) 5 mg tablet; Take 1-2 Tablets by mouth 3 times a day as needed for Muscle Spasms or Moderate Pain.        Followup: Return if symptoms worsen or fail to improve.    I have placed urine orders.  The MA is preforming urine orders under the direction of Dr. Stone  Subjective/HPI:   HPI:    Ely Perkinsromaine Rodas is a pleasant 49  y.o. female here for   Chief Complaint   Patient presents with    Other     Left Kidney pain, x1 week.         History of Present Illness  49-year-old individual presents with left flank pain and discolored urine.    Reports tingling in left spine since last Saturday, now a stiff, dull ache radiating to pelvic area, disrupting sleep and causing discomfort when lying on their side. Pain exacerbated by pressure or stretching, with numbness and tingling in leg. No history of kidney issues or UTI symptoms. Increased urination frequency without bladder infection.    Urine color changed to orange last week, returned to normal, then orange again this morning. No pain during urination. Consulted OB doctor, recommended kidney check. Pain originates from spine, radiates to pelvic area, with swelling and hardness sensation. Uncertain if pain is related to sleeping position or musculoskeletal.    Underwent thyroid surgery in April or May 2024, experiencing unusual symptoms since then.    Current Medicines (including changes today)  Current Outpatient Medications   Medication Sig Dispense Refill    predniSONE (DELTASONE) 20 MG Tab Take 2 Tablets by mouth every day for 4 days, THEN 1 Tablet every day for 3 days. 11 Tablet 0    cyclobenzaprine (FLEXERIL) 5 mg tablet Take 1-2 Tablets by mouth 3 times a day as needed for Muscle Spasms or Moderate Pain. 90 Tablet 3    phentermine (ADIPEX-P) 37.5 MG tablet Take 1 tablet Orally Once a day 90 days 90 Tablet 0    Tirzepatide-Weight Management (ZEPBOUND) 2.5 MG/0.5ML Solution Auto-injector Inject 2.5 mg under the skin every 7 days. 2 mL 0    ALPRAZolam (XANAX) 1 MG Tab Take 1 Tablet by mouth 3 times a day for 90 days. Indications: Feeling Anxious 90 Tablet 2    clobetasol (TEMOVATE) 0.05 % external solution Apply to affected area(s) on scalp daily to twice daily as needed for itching, rash, scaling. Can stop if improves. Do not use on face, axilla,groin 50 mL 2    pantoprazole (PROTONIX)  40 MG Tablet Delayed Response Take 1 Tablet by mouth every day. 90 Tablet 3    ergocalciferol (DRISDOL) 09398 UNIT capsule Take 1 capsule Orally Every 7 days for 84 days 12 Capsule 3    Estradiol 0.025 MG/24HR PATCH BIWEEKLY Apply 1 patch to skin Transdermal every 3 days for 30 days 8 Patch 3    progesterone (PROMETRIUM) 100 MG Cap Take 1 capsule by mouth at bedtime once a day 90 Capsule 3    Calcium Carbonate Antacid (TUMS ULTRA 1000) 1000 MG Chew Tab Chew 2 Tablets 3 times a day with meals. (Patient taking differently: Chew 2,000 mg every day. Pt takes QDAY, not TID with meals) 90 Tablet 3    levothyroxine (SYNTHROID) 125 MCG Tab Take 1 tablet every day by oral route. 30 Tablet 2    Estradiol 0.025 MG/24HR PATCH BIWEEKLY Apply 1 patch to skin Transdermal every 3 days (Patient taking differently: Apply 1 Patch topically see administration instructions. Pt changes twice a week, no set days.  Pt took off 2 days ago (5/6/2024)) 10 Patch 3    progesterone (PROMETRIUM) 100 MG Cap Take 1 Capsule by mouth every day at bedtime (Patient taking differently: Take 100 mg by mouth every evening.) 90 Capsule 3    ergocalciferol (DRISDOL) 46599 UNIT capsule Take 1 capsule by mouth every 7 days (Patient taking differently: Take 50,000 Units by mouth every 7 days. On Wed) 12 Capsule 3    albuterol 108 (90 Base) MCG/ACT Aero Soln inhalation aerosol Inhale 2 Puffs every 6 hours as needed for Shortness of Breath.       No current facility-administered medications for this visit.     Past Medical/ Surgical History  She  has a past medical history of Anxiety disorder (03/11/2024), Arthritis, BRCA2 negative, Bronchitis (), Depression (03/11/2024), Diabetes (HCC) (03/11/2024), Heart burn (2000), Hypothyroid, Pain (03/11/2024), and Psychiatric problem (1994).    She has no past medical history of Acute nasopharyngitis, Anesthesia, Anginal syndrome ([facility]), Arrhythmia, Asthma, Awareness under anesthesia, Blood clotting disorder ([facility]),  "Bowel habit changes, Breath shortness, Cancer (HCC), Carcinoma in situ of respiratory system, Cataract, Congestive heart failure (HCC), Continuous ambulatory peritoneal dialysis status (HCC), Coughing blood, Delayed emergence from general anesthesia, Dental disorder, Dialysis patient (HCC), Emphysema of lung (HCC), Glaucoma, Gynecological disorder, Hard to intubate, Heart murmur, Heart valve disease, Hemorrhagic disorder (HCC), Hepatitis A, Hepatitis B, Hepatitis C, Hiatus hernia syndrome, High cholesterol, Hypertension, Indigestion, Infectious disease, Jaundice, Malignant hyperthermia, Myocardial infarct (HCC), Pacemaker, Pneumonia, PONV (postoperative nausea and vomiting), Pregnant, Renal disorder, Rheumatic fever, Seizure (HCC), Sleep apnea, Snoring, Spinal headache, Stroke (HCC), Tuberculosis, Urinary bladder disorder, or Urinary incontinence.  She  has a past surgical history that includes tubal coagulation laparoscopic bilateral; abdominal hysterectomy total; leep; knee arthroscopy (Bilateral); no pertinent past surgical history; and thyroidectomy total (N/A, 4/3/2024).       Objective:   /72   Pulse 69   Temp 36.2 °C (97.2 °F) (Temporal)   Ht 1.646 m (5' 4.8\")   Wt 108 kg (238 lb 12.8 oz)   SpO2 100%  Body mass index is 39.98 kg/m².    Physical Exam  Constitutional:       General: She is not in acute distress.     Appearance: She is obese. She is not ill-appearing or toxic-appearing.   HENT:      Head: Normocephalic.      Right Ear: Tympanic membrane and external ear normal.      Left Ear: Tympanic membrane and external ear normal.      Nose: Nose normal. No rhinorrhea.      Mouth/Throat:      Mouth: Mucous membranes are moist.      Pharynx: Oropharynx is clear. No posterior oropharyngeal erythema.   Eyes:      General:         Right eye: No discharge.         Left eye: No discharge.      Conjunctiva/sclera: Conjunctivae normal.      Pupils: Pupils are equal, round, and reactive to light. "   Cardiovascular:      Rate and Rhythm: Normal rate and regular rhythm.      Heart sounds: No murmur heard.  Pulmonary:      Effort: Pulmonary effort is normal. No respiratory distress.      Breath sounds: Normal breath sounds. No wheezing.   Abdominal:      General: Abdomen is flat.   Musculoskeletal:         General: No swelling.      Cervical back: Normal range of motion and neck supple.      Lumbar back: Tenderness present.      Right lower leg: No edema.      Left lower leg: No edema.   Skin:     General: Skin is warm.   Neurological:      General: No focal deficit present.      Mental Status: She is alert and oriented to person, place, and time. Mental status is at baseline.   Psychiatric:         Mood and Affect: Mood normal.          Lab/ Imaging Results:  Results  - POCT urinalysis:    - Negative for glucose, bilirubin, ketones, blood, nitrite, and leukocyte esterase    - SG: > 1.030    - pH: 5.5    - Trace protein    - Uro: 0.2    Please note that this dictation was created using voice recognition software. I have made every reasonable attempt to correct obvious errors, but I expect that there are errors of grammar and possibly content that I did not discover before finalizing the note.

## 2025-03-03 PROCEDURE — RXMED WILLOW AMBULATORY MEDICATION CHARGE: Performed by: STUDENT IN AN ORGANIZED HEALTH CARE EDUCATION/TRAINING PROGRAM

## 2025-03-04 ENCOUNTER — PHARMACY VISIT (OUTPATIENT)
Dept: PHARMACY | Facility: MEDICAL CENTER | Age: 50
End: 2025-03-04
Payer: COMMERCIAL

## 2025-03-09 NOTE — PROGRESS NOTES
New Patient Consult Note for Endocrinology  Referred by: Myesha Stone M.D.  Previously managed by Dr. Lopez - [facility]    Reason for consult:   Hypothyroidism    HPI:  Ely Rodas is a 49 y.o. female who was referred for hypothyroidism management.     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 Patient felt a lump in neck 10 years ago.  The patient underwent further testing TFTs, a thyroid ultrasound, a needle biopsy of the thyroid.  The thyroid ultrasound which was done on 11/03/2023 revealed bilateral thyroid nodules and a 2.43 ml nodule that encroaches over isthmus. The left thyroid lobe measured 6.13 x 2.65 x 3.76 cm, the right thyroid lobe measured 5.77 x 3.65 x 5.71 cm.  The thyroid biopsy was done on 12/27/2023 and revealed atypia of undetermined significance, Thyroseq positive, intermediate-high (~70%).  The TFTs done on 01/22/2024 were as follows: TSH - 0.690, Free T4 - 1.41, T3 Free - 2.81.  The patient {{does report*does not report}} difficult swallowing. The patient {{admits*denies}} a family history of thyroid disorders in mother - Graves' Disease and was treated with iodine.  The patient {{admitsdenies*}} a history of radiation to their head or neck. All medications, medical history, diagnostic testing, and notes from the referring provider were reviewed at this visit.   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  incision c/d/i, expected swelling,  voice strong.       Current therapy:       Past Medical History:  Patient Active Problem List    Diagnosis Date Noted    Mixed hyperlipidemia 01/10/2025    Prediabetes 01/10/2025    High risk medication use 01/10/2025    Class 3 severe obesity due to excess calories with serious comorbidity and body mass index (BMI) of 40.0 to 44.9 in adult (HCC) 01/10/2025    Chronic heel pain, left 10/04/2024    Thyroid nodule 06/27/2024    Chronic midline low back pain without sciatica 08/08/2023    Scalp irritation 08/08/2023    Gastroesophageal reflux disease without esophagitis 06/16/2023    AMBIKA (generalized anxiety disorder) 06/16/2023    Acquired hypothyroidism 06/16/2023    Restless leg 06/16/2023    Left hip pain 06/16/2023    Chronic pain of both knees 06/16/2023    Anemia 06/16/2023     Past Surgical History:  Past Surgical History:   Procedure Laterality Date    THYROIDECTOMY TOTAL N/A 4/3/2024    Procedure: TOTAL THYROIDECTOMY;  Surgeon: Leandra Heart M.D.;  Location: SURGERY SAME DAY [facility];  Service: General    ABDOMINAL HYSTERECTOMY TOTAL      KNEE ARTHROSCOPY Bilateral     LEEP      NO PERTINENT PAST SURGICAL HISTORY      Tubal and hysterectomy    TUBAL COAGULATION LAPAROSCOPIC BILATERAL       Allergies:  Amoxicillin, Latex, and Penicillins    Social History:  Social History     Socioeconomic History    Marital status: Single     Spouse name: Not on file    Number of children: Not on file    Years of education: Not on file    Highest education level: GED or equivalent   Occupational History    Not on file   Tobacco Use    Smoking status: Never     Passive exposure: Current    Smokeless tobacco: Never   Vaping Use    Vaping status: Some Days    Substances: Flavoring    Devices: Disposable    Passive vaping exposure: Yes   Substance and Sexual Activity    Alcohol use: Not Currently    Drug use: Not Currently     Types: Marijuana, Inhaled, Oral     Comment: stopped approximately 1 month ago    Sexual activity:  Not Currently     Partners: Male     Birth control/protection: Female Sterilization   Other Topics Concern    Not on file   Social History Narrative    Not on file     Social Drivers of Health     Financial Resource Strain: Medium Risk (4/4/2024)    Overall Financial Resource Strain (CARDIA)     Difficulty of Paying Living Expenses: Somewhat hard   Food Insecurity: Patient Declined (4/4/2024)    Hunger Vital Sign     Worried About Running Out of Food in the Last Year: Patient declined     Ran Out of Food in the Last Year: Patient declined   Transportation Needs: Unmet Transportation Needs (4/4/2024)    PRAPARE - Transportation     Lack of Transportation (Medical): Yes     Lack of Transportation (Non-Medical): Yes   Physical Activity: Inactive (4/4/2024)    Exercise Vital Sign     Days of Exercise per Week: 0 days     Minutes of Exercise per Session: 0 min   Stress: Stress Concern Present (4/4/2024)    [employer] [place] [employer] - Occupational Stress Questionnaire     Feeling of Stress : Very much   Social Connections: Socially Isolated (4/4/2024)    Social Connection and Isolation Panel [NHANES]     Frequency of Communication with Friends and Family: Three times a week     Frequency of Social Gatherings with Friends and Family: Twice a week     Attends Buddhist Services: Never     Active Member of Clubs or Organizations: No     Attends Club or Organization Meetings: Never     Marital Status: Never    Intimate Partner Violence: Not on file   Housing Stability: Patient Declined (4/4/2024)    Housing Stability Vital Sign     Unable to Pay for Housing in the Last Year: Patient declined     Number of Places Lived in the Last Year: 1     Unstable Housing in the Last Year: Patient declined     Family History:  Family History   Problem Relation Age of Onset    Diabetes Mother     Diabetes Father     Diabetes Maternal Grandmother     Dementia Maternal Grandmother     Glaucoma Maternal Grandmother      Ovarian Cancer Neg Hx     Tubal Cancer Neg Hx     Peritoneal Cancer Neg Hx     Colorectal Cancer Neg Hx     Breast Cancer Neg Hx      Medications:  Current Outpatient Medications:     cyclobenzaprine (FLEXERIL) 5 mg tablet, Take 1-2 Tablets by mouth 3 times a day as needed for Muscle Spasms or Moderate Pain., Disp: 90 Tablet, Rfl: 3    phentermine (ADIPEX-P) 37.5 MG tablet, Take 1 tablet Orally Once a day 90 days, Disp: 90 Tablet, Rfl: 0    Tirzepatide-Weight Management (ZEPBOUND) 2.5 MG/0.5ML Solution Auto-injector, Inject 2.5 mg under the skin every 7 days., Disp: 2 mL, Rfl: 0    ALPRAZolam (XANAX) 1 MG Tab, Take 1 Tablet by mouth 3 times a day for 90 days. Indications: Feeling Anxious, Disp: 90 Tablet, Rfl: 2    clobetasol (TEMOVATE) 0.05 % external solution, Apply to affected area(s) on scalp daily to twice daily as needed for itching, rash, scaling. Can stop if improves. Do not use on face, axilla,groin, Disp: 50 mL, Rfl: 2    pantoprazole (PROTONIX) 40 MG Tablet Delayed Response, Take 1 Tablet by mouth every day., Disp: 90 Tablet, Rfl: 3    ergocalciferol (DRISDOL) 02424 UNIT capsule, Take 1 capsule Orally Every 7 days for 84 days, Disp: 12 Capsule, Rfl: 3    Estradiol 0.025 MG/24HR PATCH BIWEEKLY, Apply 1 patch to skin Transdermal every 3 days for 30 days, Disp: 8 Patch, Rfl: 3    progesterone (PROMETRIUM) 100 MG Cap, Take 1 capsule by mouth at bedtime once a day, Disp: 90 Capsule, Rfl: 3    Calcium Carbonate Antacid (TUMS ULTRA 1000) 1000 MG Chew Tab, Chew 2 Tablets 3 times a day with meals. (Patient taking differently: Chew 2,000 mg every day. Pt takes QDAY, not TID with meals), Disp: 90 Tablet, Rfl: 3    levothyroxine (SYNTHROID) 125 MCG Tab, Take 1 tablet every day by oral route., Disp: 30 Tablet, Rfl: 2    Estradiol 0.025 MG/24HR PATCH BIWEEKLY, Apply 1 patch to skin Transdermal every 3 days (Patient taking differently: Apply 1 Patch topically see administration instructions. Pt changes twice a week, no  set days.  Pt took off 2 days ago (5/6/2024)), Disp: 10 Patch, Rfl: 3    progesterone (PROMETRIUM) 100 MG Cap, Take 1 Capsule by mouth every day at bedtime (Patient taking differently: Take 100 mg by mouth every evening.), Disp: 90 Capsule, Rfl: 3    ergocalciferol (DRISDOL) 41258 UNIT capsule, Take 1 capsule by mouth every 7 days (Patient taking differently: Take 50,000 Units by mouth every 7 days. On Wed), Disp: 12 Capsule, Rfl: 3    albuterol 108 (90 Base) MCG/ACT Aero Soln inhalation aerosol, Inhale 2 Puffs every 6 hours as needed for Shortness of Breath., Disp: , Rfl:     Physical Examination:   Vital signs: There were no vitals taken for this visit.  General: No distress, cooperative, well dressed and well nourished.   Eyes: No scleral icterus or discharge  ENMT: Normal on external inspection of nose, lips, No nasal drainage   Neck: No abnormal masses on inspection  Resp: Normal effort  CVS: Regular rate and rhythm  Extremities: No edema bilateral extremities  Neuro: Alert and oriented  Skin: No rash, No Ulcers  Psych: Normal mood and affect    Labs:  - reviewed  Follicular variant of PTC, s/p thyroidectomy on 4/3/24, INTERMEDIATE?/HIGH? risk   Reference Range  06/16/23 11/17/23 01/22/24 03/13/24 04/24/24 07/02/24 01/21/25 01/27/25    TSH 0.350 - 5.500 uIU/mL 0.441 1.250 0.690  0.110 (L) 0.088 (L) 0.712 0.687   fT4 0.93 - 1.70 ng/dL 1.44 1.11 1.41  1.92 (H) 1.59 1.58 1.78 (H)   T3 60.0 - 181.0 ng/dL       125.0    T3,Free 2.00 - 4.40 pg/mL  3.00 2.81  3.11 3.31  3.18   Tg 1.3 - 31.8 ng/mL    624.8 (H)       Tg-Abs 0.0 - 4.0 IU/mL           TPO-Abs 0.0 - 9.0 IU/mL  294.0 (H)         LT4 Mcg/day             Imaging:  - reviewed  LAM on 6/19/24:  Thyroid ablation utilizing 155.8 mCi I-131.     WBS on 7/8/2024:   HISTORY/REASON FOR EXAM:  Thyroid cancer, prior thyroidectomy.  COMPARISON: None  PROCEDURE:     After the oral administration of 155.8 mCi I-131, 14 days delayed whole body planar imaging was  performed.  FINDINGS:  Focal increased activity in the RIGHT lower neck consistent with residual functioning thyroid tissue.  No abnormal activity in the chest, abdomen or pelvis.  IMPRESSION:  1.  Findings consistent with residual functioning thyroid tissue in the RIGHT lower neck.  2.  No evidence of distant metastatic disease.    Pathology report:   - reviewed  Surgical pathology after total thyroidectomy on 4/3/24:  FINAL DIAGNOSIS:   A. Right thyroid lobe and isthmus:          Follicular variant of papillary thyroid carcinoma          Size, margin status and extrathyroidal extension are unable to be evaluated, see comment          Focal vascular invasion identified   B. Right lateral thyroid mass:          Follicular adenoma, 2.3 cm          Negative for malignancy   C. Left thyroid lobe:          Multinodular adenomatoid hyperplasia          Negative for malignancy   pTNM classification: pT not assigned (cannot be determined based on available pathological information) pN Category:  pN not assigned (no nodes submitted or found)   TUMOR  Tumor focality: Cannot be determined due to substantial fragmentation   Tumor characteristics:  Tumor site: Right lobe  Tumor size: cannot be determined due to substantial fragmentation   Histologic type/subtype: encapsulated angioinvasive follicular variant papillary carcinoma   Tumor proliferative activity:  Mitotic rate: less than 3 mitoses per 2mm2   Tumor necrosis: not identified   Angioinvasion: present - one focus   Lymphatic invasion: not identified  Extrathyroidal extension: cannot be determined due to substantial fragmentation   Margin status: cannot be determined due to substantial fragmentation   REGIONAL LYMPH NODES  Regional lymph node status: not applicable (no regional lymph nodes submitted or found)   ADDITIONAL FINDINGS   Additional Findings:  Follicular adenoma     Comment:  The right thyroid (part A) is received heavily fragmented.  A suture is identified  denoting the isthmus, inked blue however peripheral margins are left un-inked as clear margins are not discernable.  Numerous fragments are involved by a follicular proliferation with nuclear grooves, pseudoinclusions and powdery chromatin in keeping with a diagnosis of follicular variant of papillary thyroid carcinoma.  While capsular invasion is difficult to assess, a focus of vascular invasion is identified.  Due to fragmentation, definitive margin evaluation, extrathyroidal extension and the size of the mass cannot be assessed.  Correlation with previous ultrasound findings may be useful in approximating the size and stage of the carcinoma.  Nikhil Painting, Alonso and Cory have reviewed selected slides and agree with the interpretation of angioinvasion.     Assessment and Plan:  #  History of follicular variant  PTC, s/p thyroidectomy on 4/3/24, INTERMEDIATE/HIGH? Risk  # S/p LAM on 6/19/24 - 155 mCi  # Postoperative primary hypothyroidism  -          RTC:    Total time (face-to-face and non-face-to face time):  min - discussion of diagnoses, treatment, prognosis, medical charts, lab, imaging, pathology review, documentation.      Plan reviewed with the patient and agreed with plan.  All questions answered to patient's satisfaction.  Thank you kindly for allowing me to participate in the care plan for this patient.    Loni More MD    CC:   Myesha Stone M.D.

## 2025-03-14 ENCOUNTER — APPOINTMENT (OUTPATIENT)
Dept: ENDOCRINOLOGY | Facility: MEDICAL CENTER | Age: 50
End: 2025-03-14
Attending: STUDENT IN AN ORGANIZED HEALTH CARE EDUCATION/TRAINING PROGRAM
Payer: COMMERCIAL

## 2025-03-14 DIAGNOSIS — E89.0 POSTOPERATIVE PRIMARY HYPOTHYROIDISM: ICD-10-CM

## 2025-03-14 DIAGNOSIS — Z85.850 HISTORY OF PAPILLARY ADENOCARCINOMA OF THYROID: ICD-10-CM

## 2025-07-21 NOTE — PROGRESS NOTES
"Subjective:     CC: Anxiety, GERD    HPI:   [facility] presents today with    Problem   Gastroesophageal Reflux Disease Without Esophagitis    This is a chronic condition.  Currently well controlled with Prevacid.      Anxiety    This is a chronic condition.  Currently she is taking Xanax 1 mg twice daily.  In the past she has needed as much as 2 mg twice daily.  She does feel like her anxiety is increasing and that she could benefit from taking another Xanax daily.  She has an upcoming thyroid surgery which is contributing.  She has a controlled substance agreement on file and urine drug screen on file.         ROS:  ROS    Objective:     Exam:  /66 (BP Location: Left arm, Patient Position: Sitting, BP Cuff Size: Adult)   Pulse 73   Temp 36.2 °C (97.1 °F) (Temporal)   Resp 16   Ht 1.6 m (5' 3\")   Wt 98.9 kg (218 lb)   BMI 38.62 kg/m²  Body mass index is 38.62 kg/m².    Physical Exam  Vitals reviewed.   Constitutional:       General: She is not in acute distress.     Appearance: She is not toxic-appearing.      Comments: Exam through observation only   HENT:      Head: Normocephalic and atraumatic.      Right Ear: External ear normal.      Left Ear: External ear normal.   Eyes:      General:         Right eye: No discharge.         Left eye: No discharge.      Extraocular Movements: Extraocular movements intact.      Conjunctiva/sclera: Conjunctivae normal.   Pulmonary:      Effort: Pulmonary effort is normal. No respiratory distress.   Skin:     General: Skin is warm and dry.   Neurological:      Mental Status: She is alert.   Psychiatric:         Mood and Affect: Mood normal.         Behavior: Behavior normal.         Thought Content: Thought content normal.         Judgment: Judgment normal.           Assessment & Plan:     48 y.o. female with the following -     1. Gastroesophageal reflux disease without esophagitis  Refill of Prevacid given  - lansoprazole (PREVACID) 30 MG CAPSULE DELAYED RELEASE; 1 " Patient is scheduled on 2/6/26 with JOSE Amezcua. Welcome letter and intake form mailed-address confirmed.   capsule before a meal  Dispense: 90 Capsule; Refill: 1    2. Anxiety  Okay to increase from 1 mg twice daily to 3 times daily due to increased anxiety.  This is still less than her maximum dose that she has been on which is 2 mg twice daily  - ALPRAZolam (XANAX) 1 MG Tab; Take 1 Tablet by mouth 3 times a day as needed for Anxiety for up to 90 days. Indications: Feeling Anxious  Dispense: 90 Tablet; Refill: 2        No follow-ups on file.    Please note that this dictation was created using voice recognition software. I have made every reasonable attempt to correct obvious errors, but I expect that there are errors of grammar and possibly content that I did not discover before finalizing the note.

## (undated) DEVICE — DRAPE MAGNETIC (INSTRA-MAG) - (30/CA)

## (undated) DEVICE — TUBING CLEARLINK DUO-VENT - C-FLO (48EA/CA)

## (undated) DEVICE — SPONGE XRAY 8X4 STERL. 12PL - (10EA/TY 80TY/CA)

## (undated) DEVICE — DRESSING TRANSPARENT FILM TEGADERM 4 X 4.75" (50EA/BX)"

## (undated) DEVICE — SUTURE 3-0 VICRYL PLUS SH - 8X 18 INCH (12/BX)

## (undated) DEVICE — SUTURE 4-0 MONOCRYL PLUS PS-2 - 27 INCH (36/BX)

## (undated) DEVICE — GOWN WARMING STANDARD FLEX - (30/CA)

## (undated) DEVICE — PEN SKIN MARKER W/RULER - (50EA/BX)

## (undated) DEVICE — SPONGE PEANUT - (5/PK 50PK/CA)

## (undated) DEVICE — CLIP MED INTNL HRZN TI ESCP - (25/BX)

## (undated) DEVICE — ELECTRODE DUAL RETURN W/ CORD - (50/PK)

## (undated) DEVICE — SET LEADWIRE 5 LEAD BEDSIDE DISPOSABLE ECG (1SET OF 5/EA)

## (undated) DEVICE — SUTURE 3-0 VICRYL PLUS SH - 27 INCH (36/BX)

## (undated) DEVICE — GLOVE SZ 6 BIOGEL PI MICRO - PF LF (50PR/BX 4BX/CA)

## (undated) DEVICE — MAT PATIENT POSITIONING PREVALON (10EA/CA)

## (undated) DEVICE — CLIP SM INTNL HRZN TI ESCP LGT - (24EA/PK 25PK/BX)

## (undated) DEVICE — SHEET THYROID - (10EA/CA)

## (undated) DEVICE — TOWEL STOP TIMEOUT SAFETY FLAG (40EA/CA)

## (undated) DEVICE — CANISTER SUCTION 3000ML MECHANICAL FILTER AUTO SHUTOFF MEDI-VAC NONSTERILE LF DISP  (40EA/CA)

## (undated) DEVICE — GOWN SURGEONS LARGE - (32/CA)

## (undated) DEVICE — SENSOR OXIMETER ADULT SPO2 RD SET (20EA/BX)

## (undated) DEVICE — GLOVE BIOGEL PI INDICATOR SZ 6.5 SURGICAL PF LF - (50/BX 4BX/CA)

## (undated) DEVICE — MASK OXYGEN VNYL ADLT MED CONC WITH 7 FOOT TUBING  - (50EA/CA)

## (undated) DEVICE — FIBRILLAR SURGICEL 4X4 - 10/CA

## (undated) DEVICE — LACTATED RINGERS INJ 1000 ML - (14EA/CA 60CA/PF)

## (undated) DEVICE — SUCTION INSTRUMENT YANKAUER BULBOUS TIP W/O VENT (50EA/CA)

## (undated) DEVICE — CANNULA O2 COMFORT SOFT EAR ADULT 7 FT TUBING (50/CA)

## (undated) DEVICE — TUBE CONNECTING SUCTION - CLEAR PLASTIC STERILE 72 IN (50EA/CA)

## (undated) DEVICE — SUTURE GENERAL

## (undated) DEVICE — SODIUM CHL IRRIGATION 0.9% 1000ML (12EA/CA)

## (undated) DEVICE — TUBE EMG NIM TRIVANTAGE 7MM (3EA/PK)

## (undated) DEVICE — KIT  I.V. START (100EA/CA)

## (undated) DEVICE — SLEEVE VASO CALF MED - (10PR/CA)

## (undated) DEVICE — PROBE PRASS STAND STIMULATING (5EA/PK)

## (undated) DEVICE — SPONGE GAUZE STER 4X4 8-PL - (2/PK 50PK/BX 12BX/CS)

## (undated) DEVICE — Device

## (undated) DEVICE — SHEAR HS FOCUS 9CM CVD - (6/BX)

## (undated) DEVICE — BOVIE NEEDLE TIP 3CM COLORADO

## (undated) DEVICE — GLOVE SZ 6.5 BIOGEL PI MICRO - PF LF (50PR/BX)

## (undated) DEVICE — GLOVE BIOGEL INDICATOR SZ 7SURGICAL PF LTX - (50/BX 4BX/CA)

## (undated) DEVICE — CANISTER SUCTION RIGID RED 1500CC (40EA/CA)

## (undated) DEVICE — CHLORAPREP 26 ML APPLICATOR - ORANGE TINT(25/CA)